# Patient Record
Sex: FEMALE | Race: ASIAN | NOT HISPANIC OR LATINO | URBAN - METROPOLITAN AREA
[De-identification: names, ages, dates, MRNs, and addresses within clinical notes are randomized per-mention and may not be internally consistent; named-entity substitution may affect disease eponyms.]

---

## 2017-01-18 PROBLEM — Z00.00 ENCOUNTER FOR PREVENTIVE HEALTH EXAMINATION: Status: ACTIVE | Noted: 2017-01-18

## 2019-06-12 ENCOUNTER — OUTPATIENT (OUTPATIENT)
Dept: OUTPATIENT SERVICES | Facility: HOSPITAL | Age: 74
LOS: 1 days | End: 2019-06-12
Payer: MEDICAID

## 2019-06-12 ENCOUNTER — APPOINTMENT (OUTPATIENT)
Dept: CV DIAGNOSTICS | Facility: HOSPITAL | Age: 74
End: 2019-06-12

## 2019-06-12 DIAGNOSIS — I25.10 ATHEROSCLEROTIC HEART DISEASE OF NATIVE CORONARY ARTERY WITHOUT ANGINA PECTORIS: ICD-10-CM

## 2019-06-12 PROCEDURE — 93018 CV STRESS TEST I&R ONLY: CPT

## 2019-06-12 PROCEDURE — 93017 CV STRESS TEST TRACING ONLY: CPT

## 2019-06-12 PROCEDURE — A9500: CPT

## 2019-06-12 PROCEDURE — 78452 HT MUSCLE IMAGE SPECT MULT: CPT | Mod: 26

## 2019-06-12 PROCEDURE — 93016 CV STRESS TEST SUPVJ ONLY: CPT

## 2019-06-12 PROCEDURE — 78452 HT MUSCLE IMAGE SPECT MULT: CPT

## 2019-07-11 ENCOUNTER — TRANSCRIPTION ENCOUNTER (OUTPATIENT)
Age: 74
End: 2019-07-11

## 2023-12-25 ENCOUNTER — INPATIENT (INPATIENT)
Facility: HOSPITAL | Age: 78
LOS: 9 days | Discharge: HOME HEALTH | End: 2024-01-04
Attending: INTERNAL MEDICINE | Admitting: INTERNAL MEDICINE
Payer: MEDICARE

## 2023-12-25 VITALS
SYSTOLIC BLOOD PRESSURE: 116 MMHG | DIASTOLIC BLOOD PRESSURE: 64 MMHG | TEMPERATURE: 98 F | WEIGHT: 119.93 LBS | RESPIRATION RATE: 20 BRPM | HEART RATE: 62 BPM | HEIGHT: 59 IN | OXYGEN SATURATION: 100 %

## 2023-12-25 LAB
ALBUMIN SERPL ELPH-MCNC: 3.6 G/DL — SIGNIFICANT CHANGE UP (ref 3.3–5)
ALBUMIN SERPL ELPH-MCNC: 3.6 G/DL — SIGNIFICANT CHANGE UP (ref 3.3–5)
ALP SERPL-CCNC: 132 U/L — HIGH (ref 40–120)
ALP SERPL-CCNC: 132 U/L — HIGH (ref 40–120)
ALT FLD-CCNC: 97 U/L — HIGH (ref 12–78)
ALT FLD-CCNC: 97 U/L — HIGH (ref 12–78)
ANION GAP SERPL CALC-SCNC: 8 MMOL/L — SIGNIFICANT CHANGE UP (ref 5–17)
ANION GAP SERPL CALC-SCNC: 8 MMOL/L — SIGNIFICANT CHANGE UP (ref 5–17)
AST SERPL-CCNC: 241 U/L — HIGH (ref 15–37)
AST SERPL-CCNC: 241 U/L — HIGH (ref 15–37)
BASOPHILS # BLD AUTO: 0.02 K/UL — SIGNIFICANT CHANGE UP (ref 0–0.2)
BASOPHILS # BLD AUTO: 0.02 K/UL — SIGNIFICANT CHANGE UP (ref 0–0.2)
BASOPHILS NFR BLD AUTO: 0.2 % — SIGNIFICANT CHANGE UP (ref 0–2)
BASOPHILS NFR BLD AUTO: 0.2 % — SIGNIFICANT CHANGE UP (ref 0–2)
BILIRUB SERPL-MCNC: 0.4 MG/DL — SIGNIFICANT CHANGE UP (ref 0.2–1.2)
BILIRUB SERPL-MCNC: 0.4 MG/DL — SIGNIFICANT CHANGE UP (ref 0.2–1.2)
BUN SERPL-MCNC: 13 MG/DL — SIGNIFICANT CHANGE UP (ref 7–23)
BUN SERPL-MCNC: 13 MG/DL — SIGNIFICANT CHANGE UP (ref 7–23)
CALCIUM SERPL-MCNC: 9 MG/DL — SIGNIFICANT CHANGE UP (ref 8.5–10.1)
CALCIUM SERPL-MCNC: 9 MG/DL — SIGNIFICANT CHANGE UP (ref 8.5–10.1)
CHLORIDE SERPL-SCNC: 106 MMOL/L — SIGNIFICANT CHANGE UP (ref 96–108)
CHLORIDE SERPL-SCNC: 106 MMOL/L — SIGNIFICANT CHANGE UP (ref 96–108)
CO2 SERPL-SCNC: 26 MMOL/L — SIGNIFICANT CHANGE UP (ref 22–31)
CO2 SERPL-SCNC: 26 MMOL/L — SIGNIFICANT CHANGE UP (ref 22–31)
CREAT SERPL-MCNC: 0.81 MG/DL — SIGNIFICANT CHANGE UP (ref 0.5–1.3)
CREAT SERPL-MCNC: 0.81 MG/DL — SIGNIFICANT CHANGE UP (ref 0.5–1.3)
EGFR: 75 ML/MIN/1.73M2 — SIGNIFICANT CHANGE UP
EGFR: 75 ML/MIN/1.73M2 — SIGNIFICANT CHANGE UP
EOSINOPHIL # BLD AUTO: 0.03 K/UL — SIGNIFICANT CHANGE UP (ref 0–0.5)
EOSINOPHIL # BLD AUTO: 0.03 K/UL — SIGNIFICANT CHANGE UP (ref 0–0.5)
EOSINOPHIL NFR BLD AUTO: 0.3 % — SIGNIFICANT CHANGE UP (ref 0–6)
EOSINOPHIL NFR BLD AUTO: 0.3 % — SIGNIFICANT CHANGE UP (ref 0–6)
GLUCOSE SERPL-MCNC: 115 MG/DL — HIGH (ref 70–99)
GLUCOSE SERPL-MCNC: 115 MG/DL — HIGH (ref 70–99)
HCT VFR BLD CALC: 35.1 % — SIGNIFICANT CHANGE UP (ref 34.5–45)
HCT VFR BLD CALC: 35.1 % — SIGNIFICANT CHANGE UP (ref 34.5–45)
HGB BLD-MCNC: 11.2 G/DL — LOW (ref 11.5–15.5)
HGB BLD-MCNC: 11.2 G/DL — LOW (ref 11.5–15.5)
IMM GRANULOCYTES NFR BLD AUTO: 0.2 % — SIGNIFICANT CHANGE UP (ref 0–0.9)
IMM GRANULOCYTES NFR BLD AUTO: 0.2 % — SIGNIFICANT CHANGE UP (ref 0–0.9)
LACTATE SERPL-SCNC: 2.9 MMOL/L — HIGH (ref 0.7–2)
LACTATE SERPL-SCNC: 2.9 MMOL/L — HIGH (ref 0.7–2)
LIDOCAIN IGE QN: >5000 U/L — HIGH (ref 13–75)
LIDOCAIN IGE QN: >5000 U/L — HIGH (ref 13–75)
LYMPHOCYTES # BLD AUTO: 1.37 K/UL — SIGNIFICANT CHANGE UP (ref 1–3.3)
LYMPHOCYTES # BLD AUTO: 1.37 K/UL — SIGNIFICANT CHANGE UP (ref 1–3.3)
LYMPHOCYTES # BLD AUTO: 12.9 % — LOW (ref 13–44)
LYMPHOCYTES # BLD AUTO: 12.9 % — LOW (ref 13–44)
MCHC RBC-ENTMCNC: 29.3 PG — SIGNIFICANT CHANGE UP (ref 27–34)
MCHC RBC-ENTMCNC: 29.3 PG — SIGNIFICANT CHANGE UP (ref 27–34)
MCHC RBC-ENTMCNC: 31.9 G/DL — LOW (ref 32–36)
MCHC RBC-ENTMCNC: 31.9 G/DL — LOW (ref 32–36)
MCV RBC AUTO: 91.9 FL — SIGNIFICANT CHANGE UP (ref 80–100)
MCV RBC AUTO: 91.9 FL — SIGNIFICANT CHANGE UP (ref 80–100)
MONOCYTES # BLD AUTO: 0.35 K/UL — SIGNIFICANT CHANGE UP (ref 0–0.9)
MONOCYTES # BLD AUTO: 0.35 K/UL — SIGNIFICANT CHANGE UP (ref 0–0.9)
MONOCYTES NFR BLD AUTO: 3.3 % — SIGNIFICANT CHANGE UP (ref 2–14)
MONOCYTES NFR BLD AUTO: 3.3 % — SIGNIFICANT CHANGE UP (ref 2–14)
NEUTROPHILS # BLD AUTO: 8.86 K/UL — HIGH (ref 1.8–7.4)
NEUTROPHILS # BLD AUTO: 8.86 K/UL — HIGH (ref 1.8–7.4)
NEUTROPHILS NFR BLD AUTO: 83.1 % — HIGH (ref 43–77)
NEUTROPHILS NFR BLD AUTO: 83.1 % — HIGH (ref 43–77)
NRBC # BLD: 0 /100 WBCS — SIGNIFICANT CHANGE UP (ref 0–0)
NRBC # BLD: 0 /100 WBCS — SIGNIFICANT CHANGE UP (ref 0–0)
PLATELET # BLD AUTO: 216 K/UL — SIGNIFICANT CHANGE UP (ref 150–400)
PLATELET # BLD AUTO: 216 K/UL — SIGNIFICANT CHANGE UP (ref 150–400)
POTASSIUM SERPL-MCNC: 4 MMOL/L — SIGNIFICANT CHANGE UP (ref 3.5–5.3)
POTASSIUM SERPL-MCNC: 4 MMOL/L — SIGNIFICANT CHANGE UP (ref 3.5–5.3)
POTASSIUM SERPL-SCNC: 4 MMOL/L — SIGNIFICANT CHANGE UP (ref 3.5–5.3)
POTASSIUM SERPL-SCNC: 4 MMOL/L — SIGNIFICANT CHANGE UP (ref 3.5–5.3)
PROT SERPL-MCNC: 7.3 GM/DL — SIGNIFICANT CHANGE UP (ref 6–8.3)
PROT SERPL-MCNC: 7.3 GM/DL — SIGNIFICANT CHANGE UP (ref 6–8.3)
RBC # BLD: 3.82 M/UL — SIGNIFICANT CHANGE UP (ref 3.8–5.2)
RBC # BLD: 3.82 M/UL — SIGNIFICANT CHANGE UP (ref 3.8–5.2)
RBC # FLD: 12.9 % — SIGNIFICANT CHANGE UP (ref 10.3–14.5)
RBC # FLD: 12.9 % — SIGNIFICANT CHANGE UP (ref 10.3–14.5)
SODIUM SERPL-SCNC: 140 MMOL/L — SIGNIFICANT CHANGE UP (ref 135–145)
SODIUM SERPL-SCNC: 140 MMOL/L — SIGNIFICANT CHANGE UP (ref 135–145)
TROPONIN I, HIGH SENSITIVITY RESULT: 5.2 NG/L — SIGNIFICANT CHANGE UP
TROPONIN I, HIGH SENSITIVITY RESULT: 5.2 NG/L — SIGNIFICANT CHANGE UP
WBC # BLD: 10.65 K/UL — HIGH (ref 3.8–10.5)
WBC # BLD: 10.65 K/UL — HIGH (ref 3.8–10.5)
WBC # FLD AUTO: 10.65 K/UL — HIGH (ref 3.8–10.5)
WBC # FLD AUTO: 10.65 K/UL — HIGH (ref 3.8–10.5)

## 2023-12-25 PROCEDURE — 93010 ELECTROCARDIOGRAM REPORT: CPT

## 2023-12-25 PROCEDURE — 71045 X-RAY EXAM CHEST 1 VIEW: CPT | Mod: 26

## 2023-12-25 PROCEDURE — 99285 EMERGENCY DEPT VISIT HI MDM: CPT

## 2023-12-25 PROCEDURE — 74174 CTA ABD&PLVS W/CONTRAST: CPT | Mod: 26,MA

## 2023-12-25 RX ORDER — PANTOPRAZOLE SODIUM 20 MG/1
40 TABLET, DELAYED RELEASE ORAL ONCE
Refills: 0 | Status: COMPLETED | OUTPATIENT
Start: 2023-12-25 | End: 2023-12-25

## 2023-12-25 RX ORDER — HYDROMORPHONE HYDROCHLORIDE 2 MG/ML
0.5 INJECTION INTRAMUSCULAR; INTRAVENOUS; SUBCUTANEOUS ONCE
Refills: 0 | Status: DISCONTINUED | OUTPATIENT
Start: 2023-12-25 | End: 2023-12-25

## 2023-12-25 RX ORDER — MORPHINE SULFATE 50 MG/1
4 CAPSULE, EXTENDED RELEASE ORAL ONCE
Refills: 0 | Status: DISCONTINUED | OUTPATIENT
Start: 2023-12-25 | End: 2023-12-25

## 2023-12-25 RX ORDER — ONDANSETRON 8 MG/1
4 TABLET, FILM COATED ORAL ONCE
Refills: 0 | Status: COMPLETED | OUTPATIENT
Start: 2023-12-25 | End: 2023-12-25

## 2023-12-25 RX ADMIN — ONDANSETRON 4 MILLIGRAM(S): 8 TABLET, FILM COATED ORAL at 21:07

## 2023-12-25 RX ADMIN — PANTOPRAZOLE SODIUM 40 MILLIGRAM(S): 20 TABLET, DELAYED RELEASE ORAL at 23:01

## 2023-12-25 RX ADMIN — MORPHINE SULFATE 4 MILLIGRAM(S): 50 CAPSULE, EXTENDED RELEASE ORAL at 21:37

## 2023-12-25 RX ADMIN — HYDROMORPHONE HYDROCHLORIDE 0.5 MILLIGRAM(S): 2 INJECTION INTRAMUSCULAR; INTRAVENOUS; SUBCUTANEOUS at 23:01

## 2023-12-25 RX ADMIN — HYDROMORPHONE HYDROCHLORIDE 0.5 MILLIGRAM(S): 2 INJECTION INTRAMUSCULAR; INTRAVENOUS; SUBCUTANEOUS at 23:31

## 2023-12-25 RX ADMIN — MORPHINE SULFATE 4 MILLIGRAM(S): 50 CAPSULE, EXTENDED RELEASE ORAL at 21:07

## 2023-12-25 NOTE — ED ADULT NURSE NOTE - CHIEF COMPLAINT QUOTE
PMH of HTN, DM  C/o generalized abdominal pain with nausea and vomiting for last 3 hrs. Also endorsed some dizziness, Reports eating [place] delicacy and started having symptoms afterwards.  Noted in distress.

## 2023-12-25 NOTE — ED ADULT NURSE NOTE - NSFALLUNIVINTERV_ED_ALL_ED
Bed/Stretcher in lowest position, wheels locked, appropriate side rails in place/Call bell, personal items and telephone in reach/Instruct patient to call for assistance before getting out of bed/chair/stretcher/Non-slip footwear applied when patient is off stretcher/[place] to call system/Physically safe environment - no spills, clutter or unnecessary equipment/Purposeful proactive rounding/Room/bathroom lighting operational, light cord in reach Bed/Stretcher in lowest position, wheels locked, appropriate side rails in place/Call bell, personal items and telephone in reach/Instruct patient to call for assistance before getting out of bed/chair/stretcher/Non-slip footwear applied when patient is off stretcher/[place] to call system/Physically safe environment - no spills, clutter or unnecessary equipment/Purposeful proactive rounding/Room/bathroom lighting operational, light cord in reach

## 2023-12-25 NOTE — ED ADULT NURSE NOTE - OBJECTIVE STATEMENT
Patient is AAOx4. 78 year old female presents to ED with complaint of generalized abdominal pain associated with nausea, vomiting for approximately the last 3 hours after she ate a purple rice cake. Denies chest pain, shortness of breath, dyspnea, diarrhea, constipation, dizziness, weakness, headache, fever, chills, cough, flank pain, hematuria, dysuria, polyuria, urinary frequency, urinary urgency, hematochezia, dyschezia. Respirations equal and unlabored, no acute distress noted at this time.

## 2023-12-25 NOTE — ED ADULT TRIAGE NOTE - CHIEF COMPLAINT QUOTE
PMH of HTN, DM  C/o generalized abdominal pain with nausea and vomiting for last 3 hrs. Also endorsed some dizziness, Reports ate [place] delicacy and started having symptoms afterwards.  Noted in distress. PMH of HTN, DM  C/o generalized abdominal pain with nausea and vomiting for last 3 hrs. Also endorsed some dizziness, Reports eating [place] delicacy and started having symptoms afterwards.  Noted in distress.

## 2023-12-26 LAB
ALBUMIN SERPL ELPH-MCNC: 3.3 G/DL — SIGNIFICANT CHANGE UP (ref 3.3–5)
ALBUMIN SERPL ELPH-MCNC: 3.3 G/DL — SIGNIFICANT CHANGE UP (ref 3.3–5)
ALP SERPL-CCNC: 111 U/L — SIGNIFICANT CHANGE UP (ref 40–120)
ALP SERPL-CCNC: 111 U/L — SIGNIFICANT CHANGE UP (ref 40–120)
ALT FLD-CCNC: 94 U/L — HIGH (ref 12–78)
ALT FLD-CCNC: 94 U/L — HIGH (ref 12–78)
ANION GAP SERPL CALC-SCNC: 7 MMOL/L — SIGNIFICANT CHANGE UP (ref 5–17)
ANION GAP SERPL CALC-SCNC: 7 MMOL/L — SIGNIFICANT CHANGE UP (ref 5–17)
AST SERPL-CCNC: 132 U/L — HIGH (ref 15–37)
AST SERPL-CCNC: 132 U/L — HIGH (ref 15–37)
BILIRUB SERPL-MCNC: 0.7 MG/DL — SIGNIFICANT CHANGE UP (ref 0.2–1.2)
BILIRUB SERPL-MCNC: 0.7 MG/DL — SIGNIFICANT CHANGE UP (ref 0.2–1.2)
BUN SERPL-MCNC: 15 MG/DL — SIGNIFICANT CHANGE UP (ref 7–23)
BUN SERPL-MCNC: 15 MG/DL — SIGNIFICANT CHANGE UP (ref 7–23)
CALCIUM SERPL-MCNC: 8.9 MG/DL — SIGNIFICANT CHANGE UP (ref 8.5–10.1)
CALCIUM SERPL-MCNC: 8.9 MG/DL — SIGNIFICANT CHANGE UP (ref 8.5–10.1)
CHLORIDE SERPL-SCNC: 105 MMOL/L — SIGNIFICANT CHANGE UP (ref 96–108)
CHLORIDE SERPL-SCNC: 105 MMOL/L — SIGNIFICANT CHANGE UP (ref 96–108)
CO2 SERPL-SCNC: 28 MMOL/L — SIGNIFICANT CHANGE UP (ref 22–31)
CO2 SERPL-SCNC: 28 MMOL/L — SIGNIFICANT CHANGE UP (ref 22–31)
CREAT SERPL-MCNC: 0.86 MG/DL — SIGNIFICANT CHANGE UP (ref 0.5–1.3)
CREAT SERPL-MCNC: 0.86 MG/DL — SIGNIFICANT CHANGE UP (ref 0.5–1.3)
EGFR: 70 ML/MIN/1.73M2 — SIGNIFICANT CHANGE UP
EGFR: 70 ML/MIN/1.73M2 — SIGNIFICANT CHANGE UP
GLUCOSE SERPL-MCNC: 151 MG/DL — HIGH (ref 70–99)
GLUCOSE SERPL-MCNC: 151 MG/DL — HIGH (ref 70–99)
HCT VFR BLD CALC: 33.3 % — LOW (ref 34.5–45)
HCT VFR BLD CALC: 33.3 % — LOW (ref 34.5–45)
HGB BLD-MCNC: 10.9 G/DL — LOW (ref 11.5–15.5)
HGB BLD-MCNC: 10.9 G/DL — LOW (ref 11.5–15.5)
LDH SERPL L TO P-CCNC: 458 U/L — HIGH (ref 50–242)
LDH SERPL L TO P-CCNC: 458 U/L — HIGH (ref 50–242)
MCHC RBC-ENTMCNC: 29.9 PG — SIGNIFICANT CHANGE UP (ref 27–34)
MCHC RBC-ENTMCNC: 29.9 PG — SIGNIFICANT CHANGE UP (ref 27–34)
MCHC RBC-ENTMCNC: 32.7 G/DL — SIGNIFICANT CHANGE UP (ref 32–36)
MCHC RBC-ENTMCNC: 32.7 G/DL — SIGNIFICANT CHANGE UP (ref 32–36)
MCV RBC AUTO: 91.5 FL — SIGNIFICANT CHANGE UP (ref 80–100)
MCV RBC AUTO: 91.5 FL — SIGNIFICANT CHANGE UP (ref 80–100)
NRBC # BLD: 0 /100 WBCS — SIGNIFICANT CHANGE UP (ref 0–0)
NRBC # BLD: 0 /100 WBCS — SIGNIFICANT CHANGE UP (ref 0–0)
PLATELET # BLD AUTO: 217 K/UL — SIGNIFICANT CHANGE UP (ref 150–400)
PLATELET # BLD AUTO: 217 K/UL — SIGNIFICANT CHANGE UP (ref 150–400)
POTASSIUM SERPL-MCNC: 4 MMOL/L — SIGNIFICANT CHANGE UP (ref 3.5–5.3)
POTASSIUM SERPL-MCNC: 4 MMOL/L — SIGNIFICANT CHANGE UP (ref 3.5–5.3)
POTASSIUM SERPL-SCNC: 4 MMOL/L — SIGNIFICANT CHANGE UP (ref 3.5–5.3)
POTASSIUM SERPL-SCNC: 4 MMOL/L — SIGNIFICANT CHANGE UP (ref 3.5–5.3)
PROT SERPL-MCNC: 6.9 GM/DL — SIGNIFICANT CHANGE UP (ref 6–8.3)
PROT SERPL-MCNC: 6.9 GM/DL — SIGNIFICANT CHANGE UP (ref 6–8.3)
RBC # BLD: 3.64 M/UL — LOW (ref 3.8–5.2)
RBC # BLD: 3.64 M/UL — LOW (ref 3.8–5.2)
RBC # FLD: 13 % — SIGNIFICANT CHANGE UP (ref 10.3–14.5)
RBC # FLD: 13 % — SIGNIFICANT CHANGE UP (ref 10.3–14.5)
SODIUM SERPL-SCNC: 140 MMOL/L — SIGNIFICANT CHANGE UP (ref 135–145)
SODIUM SERPL-SCNC: 140 MMOL/L — SIGNIFICANT CHANGE UP (ref 135–145)
TRIGL SERPL-MCNC: 177 MG/DL — HIGH
TRIGL SERPL-MCNC: 177 MG/DL — HIGH
WBC # BLD: 11.55 K/UL — HIGH (ref 3.8–10.5)
WBC # BLD: 11.55 K/UL — HIGH (ref 3.8–10.5)
WBC # FLD AUTO: 11.55 K/UL — HIGH (ref 3.8–10.5)
WBC # FLD AUTO: 11.55 K/UL — HIGH (ref 3.8–10.5)

## 2023-12-26 PROCEDURE — 76705 ECHO EXAM OF ABDOMEN: CPT | Mod: 26

## 2023-12-26 PROCEDURE — 74183 MRI ABD W/O CNTR FLWD CNTR: CPT | Mod: 26

## 2023-12-26 PROCEDURE — 99222 1ST HOSP IP/OBS MODERATE 55: CPT

## 2023-12-26 PROCEDURE — 99223 1ST HOSP IP/OBS HIGH 75: CPT

## 2023-12-26 RX ORDER — LANOLIN ALCOHOL/MO/W.PET/CERES
3 CREAM (GRAM) TOPICAL AT BEDTIME
Refills: 0 | Status: DISCONTINUED | OUTPATIENT
Start: 2023-12-26 | End: 2024-01-04

## 2023-12-26 RX ORDER — HEPARIN SODIUM 5000 [USP'U]/ML
5000 INJECTION INTRAVENOUS; SUBCUTANEOUS EVERY 8 HOURS
Refills: 0 | Status: DISCONTINUED | OUTPATIENT
Start: 2023-12-26 | End: 2024-01-04

## 2023-12-26 RX ORDER — SODIUM CHLORIDE 9 MG/ML
1000 INJECTION, SOLUTION INTRAVENOUS
Refills: 0 | Status: DISCONTINUED | OUTPATIENT
Start: 2023-12-26 | End: 2023-12-30

## 2023-12-26 RX ORDER — INFLUENZA VIRUS VACCINE 15; 15; 15; 15 UG/.5ML; UG/.5ML; UG/.5ML; UG/.5ML
0.7 SUSPENSION INTRAMUSCULAR ONCE
Refills: 0 | Status: DISCONTINUED | OUTPATIENT
Start: 2023-12-26 | End: 2024-01-04

## 2023-12-26 RX ORDER — HYDROMORPHONE HYDROCHLORIDE 2 MG/ML
1 INJECTION INTRAMUSCULAR; INTRAVENOUS; SUBCUTANEOUS ONCE
Refills: 0 | Status: DISCONTINUED | OUTPATIENT
Start: 2023-12-26 | End: 2023-12-26

## 2023-12-26 RX ORDER — ONDANSETRON 8 MG/1
4 TABLET, FILM COATED ORAL EVERY 8 HOURS
Refills: 0 | Status: DISCONTINUED | OUTPATIENT
Start: 2023-12-26 | End: 2024-01-04

## 2023-12-26 RX ORDER — ACETAMINOPHEN 500 MG
650 TABLET ORAL EVERY 6 HOURS
Refills: 0 | Status: DISCONTINUED | OUTPATIENT
Start: 2023-12-26 | End: 2024-01-04

## 2023-12-26 RX ADMIN — Medication 650 MILLIGRAM(S): at 07:04

## 2023-12-26 RX ADMIN — SODIUM CHLORIDE 100 MILLILITER(S): 9 INJECTION, SOLUTION INTRAVENOUS at 03:30

## 2023-12-26 RX ADMIN — SODIUM CHLORIDE 100 MILLILITER(S): 9 INJECTION, SOLUTION INTRAVENOUS at 15:02

## 2023-12-26 RX ADMIN — ONDANSETRON 4 MILLIGRAM(S): 8 TABLET, FILM COATED ORAL at 07:03

## 2023-12-26 RX ADMIN — HEPARIN SODIUM 5000 UNIT(S): 5000 INJECTION INTRAVENOUS; SUBCUTANEOUS at 21:43

## 2023-12-26 RX ADMIN — HEPARIN SODIUM 5000 UNIT(S): 5000 INJECTION INTRAVENOUS; SUBCUTANEOUS at 14:56

## 2023-12-26 RX ADMIN — HYDROMORPHONE HYDROCHLORIDE 1 MILLIGRAM(S): 2 INJECTION INTRAMUSCULAR; INTRAVENOUS; SUBCUTANEOUS at 03:26

## 2023-12-26 NOTE — CONSULT NOTE ADULT - NS ATTEND AMEND GEN_ALL_CORE FT
gallstone pancreatitis, tender epigastric on exam. Continue pancreatitis management. Possible OR planning prior to discharge. MRCP pending

## 2023-12-26 NOTE — H&P ADULT - ASSESSMENT
78F w/ hx of HTN, DM p/w abdominal pain. Found to have pancreatitis w/ cholelithiasis and gallbladder wall thickening c/f choledocolithiasis.    #Pancreatitis  - NPO  - IV LR  - MRCP    #FEN/PPX  - NPO  - SQH for DVT ppx

## 2023-12-26 NOTE — CONSULT NOTE ADULT - SUBJECTIVE AND OBJECTIVE BOX
Chief Complaint:  Patient is a 78y old  Female who presents with a chief complaint of abdominal pain    HPI: 78F denies pmh presents for abdominal pain for several days, denies fevers chills, nausa or vomiting.        PMH/PSH:PAST MEDICAL & SURGICAL HISTORY:      Allergies:  No Known Allergies      Medications:  acetaminophen     Tablet .. 650 milliGRAM(s) Oral every 6 hours PRN  aluminum hydroxide/magnesium hydroxide/simethicone Suspension 30 milliLiter(s) Oral every 4 hours PRN  lactated ringers. 1000 milliLiter(s) IV Continuous <Continuous>  melatonin 3 milliGRAM(s) Oral at bedtime PRN  ondansetron Injectable 4 milliGRAM(s) IV Push every 8 hours PRN      REVIEW OF SYSTEMS:  All other review of systems is negative unless indicated above.    Relevant Family History:   FAMILY HISTORY:      Relevant Social History:  Denies ETOH or tobacco history    Physical Exam:    Vital Signs:  Vital Signs Last 24 Hrs  T(C): 36.9 (25 Dec 2023 20:14), Max: 36.9 (25 Dec 2023 20:14)  T(F): 98.4 (25 Dec 2023 20:14), Max: 98.4 (25 Dec 2023 20:14)  HR: 62 (25 Dec 2023 20:14) (62 - 62)  BP: 116/64 (25 Dec 2023 20:14) (116/64 - 116/64)  BP(mean): --  RR: 20 (25 Dec 2023 20:14) (20 - 20)  SpO2: 100% (25 Dec 2023 20:14) (100% - 100%)    Parameters below as of 25 Dec 2023 20:14  Patient On (Oxygen Delivery Method): room air      Daily Height in cm: 149.86 (25 Dec 2023 20:14)    Daily     Constitutional: non toxic  HEENT: anicteric sclera,  Respiratory: normal work of breathing  Cardiac: RRR;   Gastrointestinal: soft, +epigastric tenderness, not peritoneal   Extremities: , no clubbing or cyanosis; no peripheral edema   Chief Complaint:  Patient is a 78y old  Female who presents with a chief complaint of abdominal pain    HPI: 78F pmg DM, HTN presents for abdominal pain for several days, worsening over the last 24h, also reports nausea and NBNB vomiting.  Denies fevers chills.  No previous abdominal surgeries.  Denies alcohol use.      PMH/PSH:PAST MEDICAL & SURGICAL HISTORY:      Allergies:  No Known Allergies      Medications:  acetaminophen     Tablet .. 650 milliGRAM(s) Oral every 6 hours PRN  aluminum hydroxide/magnesium hydroxide/simethicone Suspension 30 milliLiter(s) Oral every 4 hours PRN  lactated ringers. 1000 milliLiter(s) IV Continuous <Continuous>  melatonin 3 milliGRAM(s) Oral at bedtime PRN  ondansetron Injectable 4 milliGRAM(s) IV Push every 8 hours PRN      REVIEW OF SYSTEMS:  All other review of systems is negative unless indicated above.    Relevant Family History:   FAMILY HISTORY:      Relevant Social History:  Denies ETOH or tobacco history    Physical Exam:    Vital Signs:  Vital Signs Last 24 Hrs  T(C): 36.9 (25 Dec 2023 20:14), Max: 36.9 (25 Dec 2023 20:14)  T(F): 98.4 (25 Dec 2023 20:14), Max: 98.4 (25 Dec 2023 20:14)  HR: 62 (25 Dec 2023 20:14) (62 - 62)  BP: 116/64 (25 Dec 2023 20:14) (116/64 - 116/64)  BP(mean): --  RR: 20 (25 Dec 2023 20:14) (20 - 20)  SpO2: 100% (25 Dec 2023 20:14) (100% - 100%)    Parameters below as of 25 Dec 2023 20:14  Patient On (Oxygen Delivery Method): room air      Daily Height in cm: 149.86 (25 Dec 2023 20:14)    Daily     Constitutional: non toxic  HEENT: anicteric sclera,  Respiratory: normal work of breathing  Cardiac: RRR;   Gastrointestinal: soft, +epigastric tenderness, not peritoneal   Extremities: , no clubbing or cyanosis; no peripheral edema

## 2023-12-26 NOTE — ED PROVIDER NOTE - CLINICAL SUMMARY MEDICAL DECISION MAKING FREE TEXT BOX
HPI and PMH as above  Nausea, vomiting  Severe pain  Concern for pancreatitis, mesenteric ischemia, SBO, ACS  Patient labs show lipase over 5000  Patient pain controlled, IVF started  Vitals within normal limits, no fever      CT shows ": Peripancreatic fluid and stranding. No definite area of   pancreatic necrosis or organized collection. Fluid extends to the   anterior and lateral upper abdominal planes including the manju hepatis   and pericholecystic region"    Some gallstones and CBD dilation noted on CT  No signs of obstruction in labs no direct bilirubinemia  Negative troponin  EKG is non ischemic  NSR 66   Normal axis  No ST elevations or depressions    US of gallbladder ordered  Surgery consulted  Patient admitted for further management HPI and PMH as above  Nausea, vomiting  Severe pain  Concern for pancreatitis, mesenteric ischemia, SBO, ACS  Lab work and imaging ordered      Patient labs show lipase over 5000 suggesting pancreatitis   Patient pain controlled, IVF started  Vitals within normal limits, no fever      CT shows ": Peripancreatic fluid and stranding. No definite area of   pancreatic necrosis or organized collection. Fluid extends to the   anterior and lateral upper abdominal planes including the manju hepatis   and pericholecystic region"    Some gallstones and CBD dilation noted on CT  No signs of obstruction in labs no direct bilirubinemia, however surgery consulted and patient will possibly need ERCP and eventual cholecystectomy    Patient made NPO and further IVF and pain control given         Negative troponin  EKG is non ischemic  NSR 66   Normal axis  No ST elevations or depressions    US of gallbladder ordered  Patient admitted for further management of pancreatitis.

## 2023-12-26 NOTE — PATIENT PROFILE ADULT - FUNCTIONAL ASSESSMENT - BASIC MOBILITY 1.
D/C: Order noted for d/c. Pt confirmed d/c paperwork and prescriptions have correct name. Discharge and education instructions reviewed with patient. Teach-back successful. Pt verbalized understanding and signed d/c papers. Pt denied questions at this time. No acute distress noted. Patient instructed to follow-up as noted - return to emergency department if symptoms worsen. Patient verbalized understanding. Discharged per EDMD with discharge instructions. Pt discharged and ambulated to private vehicle. Patient stable upon departure. Thanked patient for choosing [facility] for care.             Maria Teresa Kaur RN  05/28/20 [id number] = A little assistance

## 2023-12-26 NOTE — CONSULT NOTE ADULT - ASSESSMENT
78F with abdominal pain, leukocytosis, elevated lipase  CT a/p shows perpancreatic fluid and stranding.  suggestive of acute pancreatitis.  Also dilated CBD. cholelithiasis  concern for gallstone pancreatitis  US abd and MRCP pending  No emergent need for surgical intervention until resolution of pancreatitis  recommend admission to medicine for supportive care and monitoring  IVF, pain control, enteral diet

## 2023-12-26 NOTE — H&P ADULT - HISTORY OF PRESENT ILLNESS
78F w/ hx of HTN, DM p/w abdominal pain. Sx began several days prior but progressively worsening. Worse after eating food. Associated w/ NBNB emesis. Denies fever/chills/sweats, diarrhea, EtOH use.    In ED, pt w/ low grade fever to 37.5 and tachycardic to 100s. Labs notable for WBC 10.7, Alk phos 241, AST 97, lipase > 5000, lactate 2.9. CT AP w/ e/o pancreatitis. RUQ US w/ cholelithiasis and gallbladder wall thickening.

## 2023-12-26 NOTE — H&P ADULT - NSHPLABSRESULTS_GEN_ALL_CORE
10.9   11.55 )-----------( 217      ( 26 Dec 2023 06:05 )             33.3     12-25    140  |  106  |  13  ----------------------------<  115<H>  4.0   |  26  |  0.81    Ca    9.0      25 Dec 2023 21:04    TPro  7.3  /  Alb  3.6  /  TBili  0.4  /  DBili  x   /  AST  241<H>  /  ALT  97<H>  /  AlkPhos  132<H>  12-25      Urinalysis Basic - ( 25 Dec 2023 21:04 )    Color: x / Appearance: x / SG: x / pH: x  Gluc: 115 mg/dL / Ketone: x  / Bili: x / Urobili: x   Blood: x / Protein: x / Nitrite: x   Leuk Esterase: x / RBC: x / WBC x   Sq Epi: x / Non Sq Epi: x / Bacteria: x

## 2023-12-26 NOTE — PATIENT PROFILE ADULT - FALL HARM RISK - HARM RISK INTERVENTIONS
Assistance with ambulation/Assistance OOB with selected safe patient handling equipment/Communicate Risk of Fall with Harm to all staff/Reinforce activity limits and safety measures with patient and family/Tailored Fall Risk Interventions/Visual Cue: Yellow wristband and red socks/Bed in lowest position, wheels locked, appropriate side rails in place/Call bell, personal items and telephone in reach/Instruct patient to call for assistance before getting out of bed or chair/Non-slip footwear when patient is out of bed/[place] to call system/Physically safe environment - no spills, clutter or unnecessary equipment/Purposeful Proactive Rounding/Room/bathroom lighting operational, light cord in reach Assistance with ambulation/Assistance OOB with selected safe patient handling equipment/Communicate Risk of Fall with Harm to all staff/Reinforce activity limits and safety measures with patient and family/Tailored Fall Risk Interventions/Visual Cue: Yellow wristband and red socks/Bed in lowest position, wheels locked, appropriate side rails in place/Call bell, personal items and telephone in reach/Instruct patient to call for assistance before getting out of bed or chair/Non-slip footwear when patient is out of bed/[place] to call system/Physically safe environment - no spills, clutter or unnecessary equipment/Purposeful Proactive Rounding/Room/bathroom lighting operational, light cord in reach

## 2023-12-26 NOTE — ED PROVIDER NOTE - OBJECTIVE STATEMENT
78 year old female here with severe epigastric abdominal pain that began after eating. The patient endorses nausea and vomiting. The patient denies fever or chills. Denies SOB or chest pain. The patient denies any BRBPR or melena. Denies ETOH use.

## 2023-12-26 NOTE — H&P ADULT - NSHPPHYSICALEXAM_GEN_ALL_CORE
T(C): 36.8 (12-26-23 @ 05:37), Max: 37.5 (12-26-23 @ 02:45)  HR: 99 (12-26-23 @ 05:37) (62 - 106)  BP: 165/71 (12-26-23 @ 05:37) (116/64 - 168/67)  RR: 18 (12-26-23 @ 05:37) (16 - 20)  SpO2: 95% (12-26-23 @ 05:37) (95% - 100%)    CONSTITUTIONAL: Well groomed, no apparent distress  EYES: PERRLA and symmetric, EOMI, No conjunctival or scleral injection, non-icteric  ENMT: Oral mucosa with moist membranes. Normal dentition; no pharyngeal injection or exudates             NECK: Supple, symmetric and without tracheal deviation   RESP: No respiratory distress, no use of accessory muscles; CTA b/l, no WRR  CV: RRR, +S1S2, no MRG; no JVD; no peripheral edema  GI: Soft, NT, ND, no rebound, no guarding; no palpable masses; no hepatosplenomegaly; no hernia palpated  LYMPH: No cervical LAD or tenderness; no axillary LAD or tenderness; no inguinal LAD or tenderness  MSK: Normal gait; No digital clubbing or cyanosis; examination of the (head/neck/spine/ribs/pelvis, RUE, LUE, RLE, LLE) without misalignment,            Normal ROM without pain, no spinal tenderness, normal muscle strength/tone  SKIN: No rashes or ulcers noted; no subcutaneous nodules or induration palpable  NEURO: CN II-XII intact; normal reflexes in upper and lower extremities, sensation intact in upper and lower extremities b/l to light touch   PSYCH: Appropriate insight/judgment; A+O x 3, mood and affect appropriate, recent/remote memory intact

## 2023-12-27 LAB
ALBUMIN SERPL ELPH-MCNC: 2.7 G/DL — LOW (ref 3.3–5)
ALBUMIN SERPL ELPH-MCNC: 2.7 G/DL — LOW (ref 3.3–5)
ALP SERPL-CCNC: 104 U/L — SIGNIFICANT CHANGE UP (ref 40–120)
ALP SERPL-CCNC: 104 U/L — SIGNIFICANT CHANGE UP (ref 40–120)
ALT FLD-CCNC: 61 U/L — SIGNIFICANT CHANGE UP (ref 12–78)
ALT FLD-CCNC: 61 U/L — SIGNIFICANT CHANGE UP (ref 12–78)
ANION GAP SERPL CALC-SCNC: 6 MMOL/L — SIGNIFICANT CHANGE UP (ref 5–17)
ANION GAP SERPL CALC-SCNC: 6 MMOL/L — SIGNIFICANT CHANGE UP (ref 5–17)
APPEARANCE UR: CLEAR — SIGNIFICANT CHANGE UP
APPEARANCE UR: CLEAR — SIGNIFICANT CHANGE UP
AST SERPL-CCNC: 52 U/L — HIGH (ref 15–37)
AST SERPL-CCNC: 52 U/L — HIGH (ref 15–37)
BACTERIA # UR AUTO: ABNORMAL /HPF
BACTERIA # UR AUTO: ABNORMAL /HPF
BILIRUB SERPL-MCNC: 1 MG/DL — SIGNIFICANT CHANGE UP (ref 0.2–1.2)
BILIRUB SERPL-MCNC: 1 MG/DL — SIGNIFICANT CHANGE UP (ref 0.2–1.2)
BILIRUB UR-MCNC: NEGATIVE — SIGNIFICANT CHANGE UP
BILIRUB UR-MCNC: NEGATIVE — SIGNIFICANT CHANGE UP
BUN SERPL-MCNC: 12 MG/DL — SIGNIFICANT CHANGE UP (ref 7–23)
BUN SERPL-MCNC: 12 MG/DL — SIGNIFICANT CHANGE UP (ref 7–23)
CALCIUM SERPL-MCNC: 8.7 MG/DL — SIGNIFICANT CHANGE UP (ref 8.5–10.1)
CALCIUM SERPL-MCNC: 8.7 MG/DL — SIGNIFICANT CHANGE UP (ref 8.5–10.1)
CHLORIDE SERPL-SCNC: 107 MMOL/L — SIGNIFICANT CHANGE UP (ref 96–108)
CHLORIDE SERPL-SCNC: 107 MMOL/L — SIGNIFICANT CHANGE UP (ref 96–108)
CO2 SERPL-SCNC: 25 MMOL/L — SIGNIFICANT CHANGE UP (ref 22–31)
CO2 SERPL-SCNC: 25 MMOL/L — SIGNIFICANT CHANGE UP (ref 22–31)
COLOR SPEC: YELLOW — SIGNIFICANT CHANGE UP
COLOR SPEC: YELLOW — SIGNIFICANT CHANGE UP
CREAT SERPL-MCNC: 0.71 MG/DL — SIGNIFICANT CHANGE UP (ref 0.5–1.3)
CREAT SERPL-MCNC: 0.71 MG/DL — SIGNIFICANT CHANGE UP (ref 0.5–1.3)
DIFF PNL FLD: ABNORMAL
DIFF PNL FLD: ABNORMAL
EGFR: 88 ML/MIN/1.73M2 — SIGNIFICANT CHANGE UP
EGFR: 88 ML/MIN/1.73M2 — SIGNIFICANT CHANGE UP
EPI CELLS # UR: PRESENT
EPI CELLS # UR: PRESENT
GLUCOSE SERPL-MCNC: 86 MG/DL — SIGNIFICANT CHANGE UP (ref 70–99)
GLUCOSE SERPL-MCNC: 86 MG/DL — SIGNIFICANT CHANGE UP (ref 70–99)
GLUCOSE UR QL: NEGATIVE MG/DL — SIGNIFICANT CHANGE UP
GLUCOSE UR QL: NEGATIVE MG/DL — SIGNIFICANT CHANGE UP
HCT VFR BLD CALC: 34.5 % — SIGNIFICANT CHANGE UP (ref 34.5–45)
HCT VFR BLD CALC: 34.5 % — SIGNIFICANT CHANGE UP (ref 34.5–45)
HCV AB S/CO SERPL IA: 0.11 S/CO — SIGNIFICANT CHANGE UP (ref 0–0.99)
HCV AB S/CO SERPL IA: 0.11 S/CO — SIGNIFICANT CHANGE UP (ref 0–0.99)
HCV AB SERPL-IMP: SIGNIFICANT CHANGE UP
HCV AB SERPL-IMP: SIGNIFICANT CHANGE UP
HGB BLD-MCNC: 11.2 G/DL — LOW (ref 11.5–15.5)
HGB BLD-MCNC: 11.2 G/DL — LOW (ref 11.5–15.5)
KETONES UR-MCNC: NEGATIVE MG/DL — SIGNIFICANT CHANGE UP
KETONES UR-MCNC: NEGATIVE MG/DL — SIGNIFICANT CHANGE UP
LACTATE SERPL-SCNC: 1.7 MMOL/L — SIGNIFICANT CHANGE UP (ref 0.7–2)
LACTATE SERPL-SCNC: 1.7 MMOL/L — SIGNIFICANT CHANGE UP (ref 0.7–2)
LEUKOCYTE ESTERASE UR-ACNC: ABNORMAL
LEUKOCYTE ESTERASE UR-ACNC: ABNORMAL
MAGNESIUM SERPL-MCNC: 1.9 MG/DL — SIGNIFICANT CHANGE UP (ref 1.6–2.6)
MAGNESIUM SERPL-MCNC: 1.9 MG/DL — SIGNIFICANT CHANGE UP (ref 1.6–2.6)
MCHC RBC-ENTMCNC: 30.1 PG — SIGNIFICANT CHANGE UP (ref 27–34)
MCHC RBC-ENTMCNC: 30.1 PG — SIGNIFICANT CHANGE UP (ref 27–34)
MCHC RBC-ENTMCNC: 32.5 G/DL — SIGNIFICANT CHANGE UP (ref 32–36)
MCHC RBC-ENTMCNC: 32.5 G/DL — SIGNIFICANT CHANGE UP (ref 32–36)
MCV RBC AUTO: 92.7 FL — SIGNIFICANT CHANGE UP (ref 80–100)
MCV RBC AUTO: 92.7 FL — SIGNIFICANT CHANGE UP (ref 80–100)
NITRITE UR-MCNC: NEGATIVE — SIGNIFICANT CHANGE UP
NITRITE UR-MCNC: NEGATIVE — SIGNIFICANT CHANGE UP
NRBC # BLD: 0 /100 WBCS — SIGNIFICANT CHANGE UP (ref 0–0)
NRBC # BLD: 0 /100 WBCS — SIGNIFICANT CHANGE UP (ref 0–0)
PH UR: 6 — SIGNIFICANT CHANGE UP (ref 5–8)
PH UR: 6 — SIGNIFICANT CHANGE UP (ref 5–8)
PHOSPHATE SERPL-MCNC: 2.9 MG/DL — SIGNIFICANT CHANGE UP (ref 2.5–4.5)
PHOSPHATE SERPL-MCNC: 2.9 MG/DL — SIGNIFICANT CHANGE UP (ref 2.5–4.5)
PLATELET # BLD AUTO: 188 K/UL — SIGNIFICANT CHANGE UP (ref 150–400)
PLATELET # BLD AUTO: 188 K/UL — SIGNIFICANT CHANGE UP (ref 150–400)
POTASSIUM SERPL-MCNC: 3.8 MMOL/L — SIGNIFICANT CHANGE UP (ref 3.5–5.3)
POTASSIUM SERPL-MCNC: 3.8 MMOL/L — SIGNIFICANT CHANGE UP (ref 3.5–5.3)
POTASSIUM SERPL-SCNC: 3.8 MMOL/L — SIGNIFICANT CHANGE UP (ref 3.5–5.3)
POTASSIUM SERPL-SCNC: 3.8 MMOL/L — SIGNIFICANT CHANGE UP (ref 3.5–5.3)
PROT SERPL-MCNC: 6.6 GM/DL — SIGNIFICANT CHANGE UP (ref 6–8.3)
PROT SERPL-MCNC: 6.6 GM/DL — SIGNIFICANT CHANGE UP (ref 6–8.3)
PROT UR-MCNC: SIGNIFICANT CHANGE UP MG/DL
PROT UR-MCNC: SIGNIFICANT CHANGE UP MG/DL
RBC # BLD: 3.72 M/UL — LOW (ref 3.8–5.2)
RBC # BLD: 3.72 M/UL — LOW (ref 3.8–5.2)
RBC # FLD: 13.4 % — SIGNIFICANT CHANGE UP (ref 10.3–14.5)
RBC # FLD: 13.4 % — SIGNIFICANT CHANGE UP (ref 10.3–14.5)
RBC CASTS # UR COMP ASSIST: SIGNIFICANT CHANGE UP /HPF (ref 0–4)
RBC CASTS # UR COMP ASSIST: SIGNIFICANT CHANGE UP /HPF (ref 0–4)
SODIUM SERPL-SCNC: 138 MMOL/L — SIGNIFICANT CHANGE UP (ref 135–145)
SODIUM SERPL-SCNC: 138 MMOL/L — SIGNIFICANT CHANGE UP (ref 135–145)
SP GR SPEC: <1.005 — LOW (ref 1–1.03)
SP GR SPEC: <1.005 — LOW (ref 1–1.03)
UROBILINOGEN FLD QL: 0.2 MG/DL — SIGNIFICANT CHANGE UP (ref 0.2–1)
UROBILINOGEN FLD QL: 0.2 MG/DL — SIGNIFICANT CHANGE UP (ref 0.2–1)
WBC # BLD: 8.18 K/UL — SIGNIFICANT CHANGE UP (ref 3.8–10.5)
WBC # BLD: 8.18 K/UL — SIGNIFICANT CHANGE UP (ref 3.8–10.5)
WBC # FLD AUTO: 8.18 K/UL — SIGNIFICANT CHANGE UP (ref 3.8–10.5)
WBC # FLD AUTO: 8.18 K/UL — SIGNIFICANT CHANGE UP (ref 3.8–10.5)
WBC UR QL: 15 /HPF — HIGH (ref 0–5)
WBC UR QL: 15 /HPF — HIGH (ref 0–5)

## 2023-12-27 PROCEDURE — 99232 SBSQ HOSP IP/OBS MODERATE 35: CPT

## 2023-12-27 PROCEDURE — 99233 SBSQ HOSP IP/OBS HIGH 50: CPT

## 2023-12-27 RX ORDER — ACETAMINOPHEN 500 MG
1000 TABLET ORAL ONCE
Refills: 0 | Status: COMPLETED | OUTPATIENT
Start: 2023-12-27 | End: 2023-12-27

## 2023-12-27 RX ORDER — BENZOCAINE AND MENTHOL 5; 1 G/100ML; G/100ML
1 LIQUID ORAL ONCE
Refills: 0 | Status: COMPLETED | OUTPATIENT
Start: 2023-12-27 | End: 2023-12-27

## 2023-12-27 RX ORDER — CEFTRIAXONE 500 MG/1
1000 INJECTION, POWDER, FOR SOLUTION INTRAMUSCULAR; INTRAVENOUS EVERY 24 HOURS
Refills: 0 | Status: COMPLETED | OUTPATIENT
Start: 2023-12-27 | End: 2023-12-29

## 2023-12-27 RX ADMIN — Medication 1000 MILLIGRAM(S): at 02:05

## 2023-12-27 RX ADMIN — BENZOCAINE AND MENTHOL 1 LOZENGE: 5; 1 LIQUID ORAL at 06:50

## 2023-12-27 RX ADMIN — Medication 400 MILLIGRAM(S): at 01:05

## 2023-12-27 RX ADMIN — SODIUM CHLORIDE 100 MILLILITER(S): 9 INJECTION, SOLUTION INTRAVENOUS at 05:44

## 2023-12-27 RX ADMIN — SODIUM CHLORIDE 100 MILLILITER(S): 9 INJECTION, SOLUTION INTRAVENOUS at 12:16

## 2023-12-27 RX ADMIN — HEPARIN SODIUM 5000 UNIT(S): 5000 INJECTION INTRAVENOUS; SUBCUTANEOUS at 05:44

## 2023-12-27 RX ADMIN — HEPARIN SODIUM 5000 UNIT(S): 5000 INJECTION INTRAVENOUS; SUBCUTANEOUS at 17:05

## 2023-12-27 RX ADMIN — Medication 650 MILLIGRAM(S): at 12:17

## 2023-12-27 RX ADMIN — Medication 650 MILLIGRAM(S): at 13:00

## 2023-12-27 RX ADMIN — CEFTRIAXONE 100 MILLIGRAM(S): 500 INJECTION, POWDER, FOR SOLUTION INTRAMUSCULAR; INTRAVENOUS at 12:16

## 2023-12-27 NOTE — PROGRESS NOTE ADULT - SUBJECTIVE AND OBJECTIVE BOX
Patient is a 78y old  Female who presents with a chief complaint of     INTERVAL HPI/OVERNIGHT EVENTS: No acute events overnight.     MEDICATIONS  (STANDING):  cefTRIAXone   IVPB 1000 milliGRAM(s) IV Intermittent every 24 hours  heparin   Injectable 5000 Unit(s) SubCutaneous every 8 hours  influenza  Vaccine (HIGH DOSE) 0.7 milliLiter(s) IntraMuscular once  lactated ringers. 1000 milliLiter(s) (100 mL/Hr) IV Continuous <Continuous>    MEDICATIONS  (PRN):  acetaminophen     Tablet .. 650 milliGRAM(s) Oral every 6 hours PRN Temp greater or equal to 38C (100.4F), Mild Pain (1 - 3)  aluminum hydroxide/magnesium hydroxide/simethicone Suspension 30 milliLiter(s) Oral every 4 hours PRN Dyspepsia  melatonin 3 milliGRAM(s) Oral at bedtime PRN Insomnia  ondansetron Injectable 4 milliGRAM(s) IV Push every 8 hours PRN Nausea and/or Vomiting      Allergies    No Known Allergies    Intolerances        REVIEW OF SYSTEMS:  CONSTITUTIONAL: +ve for fatigue  EYES: No eye pain, visual disturbances, or discharge  ENMT:  No difficulty hearing, tinnitus, vertigo; No sinus or throat pain  NECK: No pain or stiffness      Vital Signs Last 24 Hrs  T(C): 38.4 (27 Dec 2023 11:03), Max: 39.1 (26 Dec 2023 23:30)  T(F): 101.1 (27 Dec 2023 11:03), Max: 102.4 (26 Dec 2023 23:30)  HR: 97 (27 Dec 2023 11:03) (86 - 97)  BP: 120/73 (27 Dec 2023 11:03) (120/73 - 162/76)  BP(mean): --  RR: 18 (27 Dec 2023 11:03) (18 - 18)  SpO2: 97% (27 Dec 2023 11:03) (95% - 98%)    Parameters below as of 27 Dec 2023 11:03  Patient On (Oxygen Delivery Method): room air        PHYSICAL EXAM:    HEAD:  Atraumatic, Normocephalic  EYES: EOMI, PERRLA, conjunctiva and sclera clear  ENMT: No tonsillar erythema, exudates, or enlargement; Moist mucous membranes, Good dentition, No lesions  NECK: Supple, No JVD, Normal thyroid      LABS:                        11.2   8.18  )-----------( 188      ( 27 Dec 2023 05:42 )             34.5     12-27    138  |  107  |  12  ----------------------------<  86  3.8   |  25  |  0.71    Ca    8.7      27 Dec 2023 05:42  Phos  2.9     12-27  Mg     1.9     12-27    TPro  6.6  /  Alb  2.7<L>  /  TBili  1.0  /  DBili  x   /  AST  52<H>  /  ALT  61  /  AlkPhos  104  12-27      Urinalysis Basic - ( 27 Dec 2023 07:50 )    Color: Yellow / Appearance: Clear / SG: <1.005 / pH: x  Gluc: x / Ketone: Negative mg/dL  / Bili: Negative / Urobili: 0.2 mg/dL   Blood: x / Protein: Trace mg/dL / Nitrite: Negative   Leuk Esterase: Moderate / RBC: 3-5 /HPF / WBC 15 /HPF   Sq Epi: x / Non Sq Epi: x / Bacteria: Many /HPF

## 2023-12-27 NOTE — PROGRESS NOTE ADULT - ASSESSMENT
A/P    1. Acute pancreatitis:  - Pain improved  - IV fluids  - PO clears  - Surgery following    2. HTN:  - Well controlled    3. UTI:  - IV ceftriaxone (day 1/3)    Geraldine Arriola MD

## 2023-12-27 NOTE — PROGRESS NOTE ADULT - SUBJECTIVE AND OBJECTIVE BOX
SURGERY PA NOTE ON BEHALF OF DR. NOONAN:    S: Patient seen and examined at bedside.  Febrile to 102 noted overnight, work up in progress.  Patient endorses no fevers this am.  Patient reports improved abdominal pain, no flatus or BMs.  Denies fevers, chills, chest pain, SOB, palpitations, calf pain.      MEDICATIONS:  acetaminophen     Tablet .. 650 milliGRAM(s) Oral every 6 hours PRN  aluminum hydroxide/magnesium hydroxide/simethicone Suspension 30 milliLiter(s) Oral every 4 hours PRN  cefTRIAXone   IVPB 1000 milliGRAM(s) IV Intermittent every 24 hours  heparin   Injectable 5000 Unit(s) SubCutaneous every 8 hours  influenza  Vaccine (HIGH DOSE) 0.7 milliLiter(s) IntraMuscular once  lactated ringers. 1000 milliLiter(s) IV Continuous <Continuous>  melatonin 3 milliGRAM(s) Oral at bedtime PRN  ondansetron Injectable 4 milliGRAM(s) IV Push every 8 hours PRN      O:  Vital Signs Last 24 Hrs  T(C): 38.4 (27 Dec 2023 11:03), Max: 39.1 (26 Dec 2023 23:30)  T(F): 101.1 (27 Dec 2023 11:03), Max: 102.4 (26 Dec 2023 23:30)  HR: 97 (27 Dec 2023 11:03) (86 - 97)  BP: 120/73 (27 Dec 2023 11:03) (120/73 - 162/76)  BP(mean): --  RR: 18 (27 Dec 2023 11:03) (18 - 18)  SpO2: 97% (27 Dec 2023 11:03) (95% - 98%)    Parameters below as of 27 Dec 2023 11:03  Patient On (Oxygen Delivery Method): room air        I&O SUMMARY:      PHYSICAL EXAM:  Lungs: CTA bilat without W/R/R  Card: S1S2  Abd: Soft, Mild epigastric tenderness, ND.  +BS x 4.  No rebound/guarding.    Ext: Calves soft, NT, without edema bilat    LABS:                        11.2   8.18  )-----------( 188      ( 27 Dec 2023 05:42 )             34.5     12-27    138  |  107  |  12  ----------------------------<  86  3.8   |  25  |  0.71    Ca    8.7      27 Dec 2023 05:42  Phos  2.9     12-27  Mg     1.9     12-27    TPro  6.6  /  Alb  2.7<L>  /  TBili  1.0  /  DBili  x   /  AST  52<H>  /  ALT  61  /  AlkPhos  104  12-27          RADIOLOGY:  < from: MR MRCP w/wo IV Cont (12.26.23 @ 16:34) >    ACC: [id number] EXAM:  MR MRCP WAW IC   ORDERED BY: ERICA IBARRA     PROCEDURE DATE:  12/26/2023          INTERPRETATION:  CLINICAL INFORMATION: Severe acute pancreatitis    COMPARISON: Same date gallbladder ultrasound and CT abdomen and pelvis   one day prior    CONTRAST/COMPLICATIONS:  IV Contrast: Gadavist  5 cc administered   5 cc discarded  Oral Contrast: NONE  Complications: None reported at time of study completion    PROCEDURE:  MRI of the abdomen was performed.  MRCP was performed.    FINDINGS:  LOWER CHEST: Clear.    LIVER: Normal.  BILE DUCTS: 8 mm common bile duct with normal distal tapering. No filling   defect. No intrahepatic dilatation.  GALLBLADDER: A few small stones without wall thickening or signs of   inflammation.  SPLEEN: Normal.  PANCREAS: Pancreatic and peripancreatic edema with mild acute   peripancreatic fluid collections. No necrosis. No main duct dilatation or   mass lesion.  ADRENALS: Normal.  KIDNEYS/URETERS: Symmetric nephrograms. No hydronephrosis.    VISUALIZED PORTIONS:  BOWEL: No dilated bowel.  PERITONEUM: Trace ascites.  VESSELS: Aortic atherosclerosis without aneurysm.  RETROPERITONEUM/LYMPH NODES: No adenopathy.  ABDOMINAL WALL: Normal.  BONES: No aggressive lesion.    IMPRESSION:  *  Interstitial edematous pancreatitis with mild acute peripancreatic   fluid collections.  *  Small gallstones without biliary dilatation or choledocholithiasis        LUIS ALBERTO ARIAS MD; Attending Radiologist  This document has beenelectronically signed. Dec 26 2023  5:33PM    < end of copied text >      Assessment:  78F with abdominal pain, leukocytosis, elevated lipase  CT a/p shows perpancreatic fluid and stranding.  suggestive of acute pancreatitis.  Also dilated CBD. cholelithiasis  concern for gallstone pancreatitis.  MRCP performed showing  Interstitial edematous pancreatitis with mild acute peripancreatic   fluid collections.  Small gallstones without biliary dilatation or choledocholithiasis.  Febrile overnight, and this am.  No leukocytosis, normalized LFTs.  Abdominal exam reassuring      Plan:  - Clear liquid diet today  - cholecystectomy when optimized  - Remainder of care per primary team  - discussed with Dr. Noonan     SURGERY PA NOTE ON BEHALF OF DR. NOONAN:    S: Patient seen and examined at bedside.  Febrile to 102 noted overnight, work up in progress.  Patient endorses no fevers this am.  Patient reports improved abdominal pain, no flatus or BMs.  Denies fevers, chills, chest pain, SOB, palpitations, calf pain.      MEDICATIONS:  acetaminophen     Tablet .. 650 milliGRAM(s) Oral every 6 hours PRN  aluminum hydroxide/magnesium hydroxide/simethicone Suspension 30 milliLiter(s) Oral every 4 hours PRN  cefTRIAXone   IVPB 1000 milliGRAM(s) IV Intermittent every 24 hours  heparin   Injectable 5000 Unit(s) SubCutaneous every 8 hours  influenza  Vaccine (HIGH DOSE) 0.7 milliLiter(s) IntraMuscular once  lactated ringers. 1000 milliLiter(s) IV Continuous <Continuous>  melatonin 3 milliGRAM(s) Oral at bedtime PRN  ondansetron Injectable 4 milliGRAM(s) IV Push every 8 hours PRN      O:  Vital Signs Last 24 Hrs  T(C): 38.4 (27 Dec 2023 11:03), Max: 39.1 (26 Dec 2023 23:30)  T(F): 101.1 (27 Dec 2023 11:03), Max: 102.4 (26 Dec 2023 23:30)  HR: 97 (27 Dec 2023 11:03) (86 - 97)  BP: 120/73 (27 Dec 2023 11:03) (120/73 - 162/76)  BP(mean): --  RR: 18 (27 Dec 2023 11:03) (18 - 18)  SpO2: 97% (27 Dec 2023 11:03) (95% - 98%)    Parameters below as of 27 Dec 2023 11:03  Patient On (Oxygen Delivery Method): room air        I&O SUMMARY:      PHYSICAL EXAM:  Lungs: CTA bilat without W/R/R  Card: S1S2  Abd: Soft, Mild epigastric tenderness, ND.  +BS x 4.  No rebound/guarding.    Ext: Calves soft, NT, without edema bilat    LABS:                        11.2   8.18  )-----------( 188      ( 27 Dec 2023 05:42 )             34.5     12-27    138  |  107  |  12  ----------------------------<  86  3.8   |  25  |  0.71    Ca    8.7      27 Dec 2023 05:42  Phos  2.9     12-27  Mg     1.9     12-27    TPro  6.6  /  Alb  2.7<L>  /  TBili  1.0  /  DBili  x   /  AST  52<H>  /  ALT  61  /  AlkPhos  104  12-27          RADIOLOGY:  < from: MR MRCP w/wo IV Cont (12.26.23 @ 16:34) >    ACC: [id number] EXAM:  MR MRCP WAW IC   ORDERED BY: ERICA IBARRA     PROCEDURE DATE:  12/26/2023          INTERPRETATION:  CLINICAL INFORMATION: Severe acute pancreatitis    COMPARISON: Same date gallbladder ultrasound and CT abdomen and pelvis   one day prior    CONTRAST/COMPLICATIONS:  IV Contrast: Gadavist  5 cc administered   5 cc discarded  Oral Contrast: NONE  Complications: None reported at time of study completion    PROCEDURE:  MRI of the abdomen was performed.  MRCP was performed.    FINDINGS:  LOWER CHEST: Clear.    LIVER: Normal.  BILE DUCTS: 8 mm common bile duct with normal distal tapering. No filling   defect. No intrahepatic dilatation.  GALLBLADDER: A few small stones without wall thickening or signs of   inflammation.  SPLEEN: Normal.  PANCREAS: Pancreatic and peripancreatic edema with mild acute   peripancreatic fluid collections. No necrosis. No main duct dilatation or   mass lesion.  ADRENALS: Normal.  KIDNEYS/URETERS: Symmetric nephrograms. No hydronephrosis.    VISUALIZED PORTIONS:  BOWEL: No dilated bowel.  PERITONEUM: Trace ascites.  VESSELS: Aortic atherosclerosis without aneurysm.  RETROPERITONEUM/LYMPH NODES: No adenopathy.  ABDOMINAL WALL: Normal.  BONES: No aggressive lesion.    IMPRESSION:  *  Interstitial edematous pancreatitis with mild acute peripancreatic   fluid collections.  *  Small gallstones without biliary dilatation or choledocholithiasis        LUIS ALBERTO ARIAS MD; Attending Radiologist  This document has beenelectronically signed. Dec 26 2023  5:33PM    < end of copied text >      Assessment:  78F with abdominal pain, leukocytosis, elevated lipase  CT a/p shows perpancreatic fluid and stranding.  suggestive of acute pancreatitis.  Also dilated CBD. cholelithiasis  concern for gallstone pancreatitis.  MRCP performed showing  Interstitial edematous pancreatitis with mild acute peripancreatic   fluid collections.  Small gallstones without biliary dilatation or choledocholithiasis.  Febrile overnight, and this am.  No leukocytosis, normalized LFTs.  Abdominal exam reassuring      Plan:  - Clear liquid diet today  - cholecystectomy when optimized  - Remainder of care per primary team  - discussed with Dr. Noonan

## 2023-12-28 PROCEDURE — 99232 SBSQ HOSP IP/OBS MODERATE 35: CPT

## 2023-12-28 RX ORDER — ALBUTEROL 90 UG/1
2 AEROSOL, METERED ORAL EVERY 6 HOURS
Refills: 0 | Status: DISCONTINUED | OUTPATIENT
Start: 2023-12-28 | End: 2024-01-04

## 2023-12-28 RX ADMIN — HEPARIN SODIUM 5000 UNIT(S): 5000 INJECTION INTRAVENOUS; SUBCUTANEOUS at 14:01

## 2023-12-28 RX ADMIN — CEFTRIAXONE 100 MILLIGRAM(S): 500 INJECTION, POWDER, FOR SOLUTION INTRAMUSCULAR; INTRAVENOUS at 11:16

## 2023-12-28 RX ADMIN — ALBUTEROL 2 PUFF(S): 90 AEROSOL, METERED ORAL at 16:49

## 2023-12-28 RX ADMIN — Medication 650 MILLIGRAM(S): at 16:49

## 2023-12-28 RX ADMIN — HEPARIN SODIUM 5000 UNIT(S): 5000 INJECTION INTRAVENOUS; SUBCUTANEOUS at 02:37

## 2023-12-28 RX ADMIN — Medication 100 MILLIGRAM(S): at 16:49

## 2023-12-28 RX ADMIN — Medication 650 MILLIGRAM(S): at 00:20

## 2023-12-28 RX ADMIN — HEPARIN SODIUM 5000 UNIT(S): 5000 INJECTION INTRAVENOUS; SUBCUTANEOUS at 21:34

## 2023-12-28 RX ADMIN — HEPARIN SODIUM 5000 UNIT(S): 5000 INJECTION INTRAVENOUS; SUBCUTANEOUS at 10:15

## 2023-12-28 NOTE — PROGRESS NOTE ADULT - ASSESSMENT
79 Y/O female with gallstone pancreatitis now improving. Patient febrile with a Tmax of 102.4.   PMHx of      PLAN:     - OR planning when fevers resolve   - Continue care per primary team   - Multimodal pain control  - OOB as tolerated, walking as tolerated; DVT PPx  - Discussed with Dr. Noonan

## 2023-12-28 NOTE — PROGRESS NOTE ADULT - SUBJECTIVE AND OBJECTIVE BOX
Patient seen and examined at bedside with Dr. Noonan offering no complaints.   Tolerating clear liquid diet.  Admits to flatus/BM.   Denies pain, nausea/ vomiting, chills, SOB, chest pain, calf tenderness.          Vital Signs Last 24 Hrs  T(F): 99.3 (12-28-23 @ 06:16), Max: 102.9 (12-27-23 @ 23:56)  HR: 88 (12-28-23 @ 05:06)  BP: 152/74 (12-28-23 @ 05:06)  RR: 18 (12-28-23 @ 05:06)  SpO2: 97% (12-28-23 @ 05:06)  Wt(kg): --   CAPILLARY BLOOD GLUCOSE          GENERAL: Alert, NAD  CHEST/LUNG: Respirations non labored, good inspiratory effort  HEART: S1S2  HEENT: NCAT, EOMI, conjunctiva clear   ABDOMEN: Nondistended, + bowel sounds, nontender  EXTREMITIES:  No calf tenderness, no edema    I&O's Detail      LABS:                        11.2   8.18  )-----------( 188      ( 27 Dec 2023 05:42 )             34.5     12-27    138  |  107  |  12  ----------------------------<  86  3.8   |  25  |  0.71    Ca    8.7      27 Dec 2023 05:42  Phos  2.9     12-27  Mg     1.9     12-27    TPro  6.6  /  Alb  2.7<L>  /  TBili  1.0  /  DBili  x   /  AST  52<H>  /  ALT  61  /  AlkPhos  104  12-27        RADIOLOGY & ADDITIONAL STUDIES:    < from: MR MRCP w/wo IV Cont (12.26.23 @ 16:34) >    ACC: [id number] EXAM:  MR MRCP WAW IC   ORDERED BY: ERICA IBARRA     PROCEDURE DATE:  12/26/2023          INTERPRETATION:  CLINICAL INFORMATION: Severe acute pancreatitis    COMPARISON: Same date gallbladder ultrasound and CT abdomen and pelvis   one day prior    CONTRAST/COMPLICATIONS:  IV Contrast: Gadavist  5 cc administered   5 cc discarded  Oral Contrast: NONE  Complications: None reported at time of study completion    PROCEDURE:  MRI of the abdomen was performed.  MRCP was performed.    FINDINGS:  LOWER CHEST: Clear.    LIVER: Normal.  BILE DUCTS: 8 mm common bile duct with normal distal tapering. No filling   defect. No intrahepatic dilatation.  GALLBLADDER: A few small stones without wall thickening or signs of   inflammation.  SPLEEN: Normal.  PANCREAS: Pancreatic and peripancreatic edema with mild acute   peripancreatic fluid collections. No necrosis. No main duct dilatation or   mass lesion.  ADRENALS: Normal.  KIDNEYS/URETERS: Symmetric nephrograms. No hydronephrosis.    VISUALIZED PORTIONS:  BOWEL: No dilated bowel.  PERITONEUM: Trace ascites.  VESSELS: Aortic atherosclerosis without aneurysm.  RETROPERITONEUM/LYMPH NODES: No adenopathy.  ABDOMINAL WALL: Normal.  BONES: No aggressive lesion.    IMPRESSION:  *  Interstitial edematous pancreatitis with mild acute peripancreatic   fluid collections.  *  Small gallstones without biliary dilatation or choledocholithiasis.      --- End of Report ---            LUIS ALBERTO ARIAS MD; Attending Radiologist  This document has beenelectronically signed. Dec 26 2023  5:33PM    < end of copied text >         Patient seen and examined at bedside with Dr. Noonan offering no complaints.   Tolerating clear liquid diet.  Admits to flatus/BM.   Denies pain, nausea/ vomiting, chills, SOB, chest pain, calf tenderness.          Vital Signs Last 24 Hrs  T(F): 99.3 (12-28-23 @ 06:16), Max: 102.9 (12-27-23 @ 23:56)  HR: 88 (12-28-23 @ 05:06)  BP: 152/74 (12-28-23 @ 05:06)  RR: 18 (12-28-23 @ 05:06)  SpO2: 97% (12-28-23 @ 05:06)  Wt(kg): --   CAPILLARY BLOOD GLUCOSE          GENERAL: Alert, NAD  CHEST/LUNG: Respirations non labored, good inspiratory effort  HEART: S1S2  HEENT: NCAT, EOMI, conjunctiva clear   ABDOMEN: Nondistended, + bowel sounds, nontender  EXTREMITIES:  No calf tenderness, no edema    I&O's Detail      LABS:                        11.2   8.18  )-----------( 188      ( 27 Dec 2023 05:42 )             34.5     12-27    138  |  107  |  12  ----------------------------<  86  3.8   |  25  |  0.71    Ca    8.7      27 Dec 2023 05:42  Phos  2.9     12-27  Mg     1.9     12-27    TPro  6.6  /  Alb  2.7<L>  /  TBili  1.0  /  DBili  x   /  AST  52<H>  /  ALT  61  /  AlkPhos  104  12-27        RADIOLOGY & ADDITIONAL STUDIES:    < from: MR MRCP w/wo IV Cont (12.26.23 @ 16:34) >    ACC: [id number] EXAM:  MR MRCP WAW IC   ORDERED BY: ERICA IBARRA     PROCEDURE DATE:  12/26/2023          INTERPRETATION:  CLINICAL INFORMATION: Severe acute pancreatitis    COMPARISON: Same date gallbladder ultrasound and CT abdomen and pelvis   one day prior    CONTRAST/COMPLICATIONS:  IV Contrast: Gadavist  5 cc administered   5 cc discarded  Oral Contrast: NONE  Complications: None reported at time of study completion    PROCEDURE:  MRI of the abdomen was performed.  MRCP was performed.    FINDINGS:  LOWER CHEST: Clear.    LIVER: Normal.  BILE DUCTS: 8 mm common bile duct with normal distal tapering. No filling   defect. No intrahepatic dilatation.  GALLBLADDER: A few small stones without wall thickening or signs of   inflammation.  SPLEEN: Normal.  PANCREAS: Pancreatic and peripancreatic edema with mild acute   peripancreatic fluid collections. No necrosis. No main duct dilatation or   mass lesion.  ADRENALS: Normal.  KIDNEYS/URETERS: Symmetric nephrograms. No hydronephrosis.    VISUALIZED PORTIONS:  BOWEL: No dilated bowel.  PERITONEUM: Trace ascites.  VESSELS: Aortic atherosclerosis without aneurysm.  RETROPERITONEUM/LYMPH NODES: No adenopathy.  ABDOMINAL WALL: Normal.  BONES: No aggressive lesion.    IMPRESSION:  *  Interstitial edematous pancreatitis with mild acute peripancreatic   fluid collections.  *  Small gallstones without biliary dilatation or choledocholithiasis.      --- End of Report ---            LUIS ALBERTO ARIAS MD; Attending Radiologist  This document has beenelectronically signed. Dec 26 2023  5:33PM    < end of copied text >

## 2023-12-28 NOTE — PROGRESS NOTE ADULT - SUBJECTIVE AND OBJECTIVE BOX
Patient is a 78y old  Female who presents with a chief complaint of     INTERVAL HPI/OVERNIGHT EVENTS: No acute events overnight. Febrile.     MEDICATIONS  (STANDING):  cefTRIAXone   IVPB 1000 milliGRAM(s) IV Intermittent every 24 hours  heparin   Injectable 5000 Unit(s) SubCutaneous every 8 hours  influenza  Vaccine (HIGH DOSE) 0.7 milliLiter(s) IntraMuscular once  lactated ringers. 1000 milliLiter(s) (100 mL/Hr) IV Continuous <Continuous>    MEDICATIONS  (PRN):  acetaminophen     Tablet .. 650 milliGRAM(s) Oral every 6 hours PRN Temp greater or equal to 38C (100.4F), Mild Pain (1 - 3)  aluminum hydroxide/magnesium hydroxide/simethicone Suspension 30 milliLiter(s) Oral every 4 hours PRN Dyspepsia  melatonin 3 milliGRAM(s) Oral at bedtime PRN Insomnia  ondansetron Injectable 4 milliGRAM(s) IV Push every 8 hours PRN Nausea and/or Vomiting      Allergies    No Known Allergies    Intolerances        REVIEW OF SYSTEMS:  CONSTITUTIONAL: +ve for fatigue  EYES: No eye pain, visual disturbances, or discharge  ENMT:  No difficulty hearing, tinnitus, vertigo; No sinus or throat pain  NECK: No pain or stiffness      Vital Signs Last 24 Hrs  T(C): 39.1 (28 Dec 2023 10:47), Max: 39.4 (27 Dec 2023 23:56)  T(F): 102.4 (28 Dec 2023 10:47), Max: 102.9 (27 Dec 2023 23:56)  HR: 96 (28 Dec 2023 10:47) (86 - 99)  BP: 164/64 (28 Dec 2023 10:47) (152/74 - 176/73)  BP(mean): --  RR: 18 (28 Dec 2023 10:47) (17 - 18)  SpO2: 98% (28 Dec 2023 10:47) (96% - 98%)    Parameters below as of 28 Dec 2023 10:47  Patient On (Oxygen Delivery Method): room air        PHYSICAL EXAM:    HEAD:  Atraumatic, Normocephalic  EYES: EOMI, PERRLA, conjunctiva and sclera clear  ENMT: No tonsillar erythema, exudates, or enlargement; Moist mucous membranes, Good dentition, No lesions  NECK: Supple, No JVD, Normal thyroid  NERVOUS SYSTEM:  Alert & Oriented X3    LABS:                        11.2   8.18  )-----------( 188      ( 27 Dec 2023 05:42 )             34.5     12-27    138  |  107  |  12  ----------------------------<  86  3.8   |  25  |  0.71    Ca    8.7      27 Dec 2023 05:42  Phos  2.9     12-27  Mg     1.9     12-27    TPro  6.6  /  Alb  2.7<L>  /  TBili  1.0  /  DBili  x   /  AST  52<H>  /  ALT  61  /  AlkPhos  104  12-27      Urinalysis Basic - ( 27 Dec 2023 07:50 )    Color: Yellow / Appearance: Clear / SG: <1.005 / pH: x  Gluc: x / Ketone: Negative mg/dL  / Bili: Negative / Urobili: 0.2 mg/dL   Blood: x / Protein: Trace mg/dL / Nitrite: Negative   Leuk Esterase: Moderate / RBC: 3-5 /HPF / WBC 15 /HPF   Sq Epi: x / Non Sq Epi: x / Bacteria: Many /HPF

## 2023-12-28 NOTE — PROGRESS NOTE ADULT - ASSESSMENT
A/P    1. Acute pancreatitis:  - Pain improved  - IV fluids  - PO clears  - Surgery following  - OR once fevers resolve    2. HTN:  - Well controlled    3. UTI:  - IV ceftriaxone (day 2/3)    Geraldine Arriola MD

## 2023-12-29 LAB
ALBUMIN SERPL ELPH-MCNC: 2.2 G/DL — LOW (ref 3.3–5)
ALBUMIN SERPL ELPH-MCNC: 2.2 G/DL — LOW (ref 3.3–5)
ALP SERPL-CCNC: 141 U/L — HIGH (ref 40–120)
ALP SERPL-CCNC: 141 U/L — HIGH (ref 40–120)
ALT FLD-CCNC: 34 U/L — SIGNIFICANT CHANGE UP (ref 12–78)
ALT FLD-CCNC: 34 U/L — SIGNIFICANT CHANGE UP (ref 12–78)
ANION GAP SERPL CALC-SCNC: 8 MMOL/L — SIGNIFICANT CHANGE UP (ref 5–17)
ANION GAP SERPL CALC-SCNC: 8 MMOL/L — SIGNIFICANT CHANGE UP (ref 5–17)
AST SERPL-CCNC: 20 U/L — SIGNIFICANT CHANGE UP (ref 15–37)
AST SERPL-CCNC: 20 U/L — SIGNIFICANT CHANGE UP (ref 15–37)
BILIRUB SERPL-MCNC: 0.7 MG/DL — SIGNIFICANT CHANGE UP (ref 0.2–1.2)
BILIRUB SERPL-MCNC: 0.7 MG/DL — SIGNIFICANT CHANGE UP (ref 0.2–1.2)
BUN SERPL-MCNC: 7 MG/DL — SIGNIFICANT CHANGE UP (ref 7–23)
BUN SERPL-MCNC: 7 MG/DL — SIGNIFICANT CHANGE UP (ref 7–23)
CALCIUM SERPL-MCNC: 8.1 MG/DL — LOW (ref 8.5–10.1)
CALCIUM SERPL-MCNC: 8.1 MG/DL — LOW (ref 8.5–10.1)
CHLORIDE SERPL-SCNC: 104 MMOL/L — SIGNIFICANT CHANGE UP (ref 96–108)
CHLORIDE SERPL-SCNC: 104 MMOL/L — SIGNIFICANT CHANGE UP (ref 96–108)
CO2 SERPL-SCNC: 24 MMOL/L — SIGNIFICANT CHANGE UP (ref 22–31)
CO2 SERPL-SCNC: 24 MMOL/L — SIGNIFICANT CHANGE UP (ref 22–31)
CREAT SERPL-MCNC: 0.53 MG/DL — SIGNIFICANT CHANGE UP (ref 0.5–1.3)
CREAT SERPL-MCNC: 0.53 MG/DL — SIGNIFICANT CHANGE UP (ref 0.5–1.3)
EGFR: 96 ML/MIN/1.73M2 — SIGNIFICANT CHANGE UP
EGFR: 96 ML/MIN/1.73M2 — SIGNIFICANT CHANGE UP
GLUCOSE SERPL-MCNC: 141 MG/DL — HIGH (ref 70–99)
GLUCOSE SERPL-MCNC: 141 MG/DL — HIGH (ref 70–99)
HCT VFR BLD CALC: 29.8 % — LOW (ref 34.5–45)
HCT VFR BLD CALC: 29.8 % — LOW (ref 34.5–45)
HGB BLD-MCNC: 9.9 G/DL — LOW (ref 11.5–15.5)
HGB BLD-MCNC: 9.9 G/DL — LOW (ref 11.5–15.5)
MCHC RBC-ENTMCNC: 29.7 PG — SIGNIFICANT CHANGE UP (ref 27–34)
MCHC RBC-ENTMCNC: 29.7 PG — SIGNIFICANT CHANGE UP (ref 27–34)
MCHC RBC-ENTMCNC: 33.2 G/DL — SIGNIFICANT CHANGE UP (ref 32–36)
MCHC RBC-ENTMCNC: 33.2 G/DL — SIGNIFICANT CHANGE UP (ref 32–36)
MCV RBC AUTO: 89.5 FL — SIGNIFICANT CHANGE UP (ref 80–100)
MCV RBC AUTO: 89.5 FL — SIGNIFICANT CHANGE UP (ref 80–100)
NRBC # BLD: 0 /100 WBCS — SIGNIFICANT CHANGE UP (ref 0–0)
NRBC # BLD: 0 /100 WBCS — SIGNIFICANT CHANGE UP (ref 0–0)
PLATELET # BLD AUTO: 185 K/UL — SIGNIFICANT CHANGE UP (ref 150–400)
PLATELET # BLD AUTO: 185 K/UL — SIGNIFICANT CHANGE UP (ref 150–400)
POTASSIUM SERPL-MCNC: 3.4 MMOL/L — LOW (ref 3.5–5.3)
POTASSIUM SERPL-MCNC: 3.4 MMOL/L — LOW (ref 3.5–5.3)
POTASSIUM SERPL-SCNC: 3.4 MMOL/L — LOW (ref 3.5–5.3)
POTASSIUM SERPL-SCNC: 3.4 MMOL/L — LOW (ref 3.5–5.3)
PROT SERPL-MCNC: 6 GM/DL — SIGNIFICANT CHANGE UP (ref 6–8.3)
PROT SERPL-MCNC: 6 GM/DL — SIGNIFICANT CHANGE UP (ref 6–8.3)
RBC # BLD: 3.33 M/UL — LOW (ref 3.8–5.2)
RBC # BLD: 3.33 M/UL — LOW (ref 3.8–5.2)
RBC # FLD: 13 % — SIGNIFICANT CHANGE UP (ref 10.3–14.5)
RBC # FLD: 13 % — SIGNIFICANT CHANGE UP (ref 10.3–14.5)
SODIUM SERPL-SCNC: 136 MMOL/L — SIGNIFICANT CHANGE UP (ref 135–145)
SODIUM SERPL-SCNC: 136 MMOL/L — SIGNIFICANT CHANGE UP (ref 135–145)
WBC # BLD: 7.49 K/UL — SIGNIFICANT CHANGE UP (ref 3.8–10.5)
WBC # BLD: 7.49 K/UL — SIGNIFICANT CHANGE UP (ref 3.8–10.5)
WBC # FLD AUTO: 7.49 K/UL — SIGNIFICANT CHANGE UP (ref 3.8–10.5)
WBC # FLD AUTO: 7.49 K/UL — SIGNIFICANT CHANGE UP (ref 3.8–10.5)

## 2023-12-29 PROCEDURE — 99232 SBSQ HOSP IP/OBS MODERATE 35: CPT

## 2023-12-29 RX ORDER — POTASSIUM CHLORIDE 20 MEQ
40 PACKET (EA) ORAL ONCE
Refills: 0 | Status: COMPLETED | OUTPATIENT
Start: 2023-12-29 | End: 2023-12-29

## 2023-12-29 RX ORDER — POTASSIUM CHLORIDE 20 MEQ
20 PACKET (EA) ORAL ONCE
Refills: 0 | Status: COMPLETED | OUTPATIENT
Start: 2023-12-29 | End: 2023-12-29

## 2023-12-29 RX ORDER — LOPERAMIDE HCL 2 MG
2 TABLET ORAL ONCE
Refills: 0 | Status: COMPLETED | OUTPATIENT
Start: 2023-12-29 | End: 2023-12-29

## 2023-12-29 RX ADMIN — Medication 40 MILLIEQUIVALENT(S): at 08:00

## 2023-12-29 RX ADMIN — SODIUM CHLORIDE 100 MILLILITER(S): 9 INJECTION, SOLUTION INTRAVENOUS at 05:04

## 2023-12-29 RX ADMIN — HEPARIN SODIUM 5000 UNIT(S): 5000 INJECTION INTRAVENOUS; SUBCUTANEOUS at 05:02

## 2023-12-29 RX ADMIN — Medication 20 MILLIEQUIVALENT(S): at 11:00

## 2023-12-29 RX ADMIN — CEFTRIAXONE 100 MILLIGRAM(S): 500 INJECTION, POWDER, FOR SOLUTION INTRAMUSCULAR; INTRAVENOUS at 10:59

## 2023-12-29 RX ADMIN — HEPARIN SODIUM 5000 UNIT(S): 5000 INJECTION INTRAVENOUS; SUBCUTANEOUS at 14:34

## 2023-12-29 RX ADMIN — Medication 650 MILLIGRAM(S): at 05:00

## 2023-12-29 RX ADMIN — Medication 2 MILLIGRAM(S): at 17:19

## 2023-12-29 RX ADMIN — SODIUM CHLORIDE 100 MILLILITER(S): 9 INJECTION, SOLUTION INTRAVENOUS at 18:41

## 2023-12-29 RX ADMIN — HEPARIN SODIUM 5000 UNIT(S): 5000 INJECTION INTRAVENOUS; SUBCUTANEOUS at 21:35

## 2023-12-29 NOTE — PROGRESS NOTE ADULT - ASSESSMENT
A/P    1. Acute pancreatitis:  - Pain improved  - IV fluids  - PO clears  - Surgery following  - OR once fevers resolve    2. HTN:  - Well controlled    3. UTI:  - IV ceftriaxone (day 3/3)    Geraldine Arriola MD

## 2023-12-29 NOTE — PROGRESS NOTE ADULT - SUBJECTIVE AND OBJECTIVE BOX
Patient seen and examined bedside resting comfortably.  No complaints offered.   Denies nausea, vomiting, diarrhea, fevers, chills. Tolerating clear liquid diet, but endorses low appetite         T(F): 98.7 (12-29-23 @ 06:31), Max: 103 (12-28-23 @ 16:40)  HR: 90 (12-29-23 @ 06:31) (90 - 99)  BP: 149/65 (12-29-23 @ 04:46) (149/65 - 174/73)  RR: 18 (12-29-23 @ 04:46) (18 - 18)  SpO2: 95% (12-29-23 @ 04:46) (95% - 98%)  Wt(kg): --  CAPILLARY BLOOD GLUCOSE          PHYSICAL EXAM:  General: NAD  Neuro:  Alert & oriented x 3  HEENT: NCAT, EOMI, conjunctiva clear  CV: +S1+S2 regular rate and rhythm  Lung:respirations nonlabored, good inspiratory effort  Abdomen: soft, minimally distended, nontender  Extremities: no pedal edema or calf tenderness noted     LABS:                        9.9    7.49  )-----------( 185      ( 29 Dec 2023 06:25 )             29.8     12-29    136  |  104  |  7   ----------------------------<  141<H>  3.4<L>   |  24  |  0.53    Ca    8.1<L>      29 Dec 2023 06:25    TPro  6.0  /  Alb  2.2<L>  /  TBili  0.7  /  DBili  x   /  AST  20  /  ALT  34  /  AlkPhos  141<H>  12-29        I&O:     Culture Results:   No growth at 24 hours (12-27 @ 05:42)      A/P: 79 yo F with gallstone pancreatitis. tmax 102  - advise repeating CT this weekend   - OR planning when fevers resolve   - Continue care per primary team   - Multimodal pain control  - OOB as tolerated, walking as tolerated; DVT PPx  - Discussed with Dr. Noonan   Patient seen and examined bedside resting comfortably.  No complaints offered.   Denies nausea, vomiting, diarrhea, fevers, chills. Tolerating clear liquid diet, but endorses low appetite         T(F): 98.7 (12-29-23 @ 06:31), Max: 103 (12-28-23 @ 16:40)  HR: 90 (12-29-23 @ 06:31) (90 - 99)  BP: 149/65 (12-29-23 @ 04:46) (149/65 - 174/73)  RR: 18 (12-29-23 @ 04:46) (18 - 18)  SpO2: 95% (12-29-23 @ 04:46) (95% - 98%)  Wt(kg): --  CAPILLARY BLOOD GLUCOSE          PHYSICAL EXAM:  General: NAD  Neuro:  Alert & oriented x 3  HEENT: NCAT, EOMI, conjunctiva clear  CV: +S1+S2 regular rate and rhythm  Lung:respirations nonlabored, good inspiratory effort  Abdomen: soft, minimally distended, nontender  Extremities: no pedal edema or calf tenderness noted     LABS:                        9.9    7.49  )-----------( 185      ( 29 Dec 2023 06:25 )             29.8     12-29    136  |  104  |  7   ----------------------------<  141<H>  3.4<L>   |  24  |  0.53    Ca    8.1<L>      29 Dec 2023 06:25    TPro  6.0  /  Alb  2.2<L>  /  TBili  0.7  /  DBili  x   /  AST  20  /  ALT  34  /  AlkPhos  141<H>  12-29        I&O:     Culture Results:   No growth at 24 hours (12-27 @ 05:42)      A/P: 77 yo F with gallstone pancreatitis. tmax 102  - advise repeating CT this weekend   - OR planning when fevers resolve   - Continue care per primary team   - Multimodal pain control  - OOB as tolerated, walking as tolerated; DVT PPx  - Discussed with Dr. Noonan

## 2023-12-29 NOTE — PROGRESS NOTE ADULT - SUBJECTIVE AND OBJECTIVE BOX
Patient is a 78y old  Female who presents with a chief complaint of     INTERVAL HPI/OVERNIGHT EVENTS: No acute events overnight, febrile again.     MEDICATIONS  (STANDING):  heparin   Injectable 5000 Unit(s) SubCutaneous every 8 hours  influenza  Vaccine (HIGH DOSE) 0.7 milliLiter(s) IntraMuscular once  lactated ringers. 1000 milliLiter(s) (100 mL/Hr) IV Continuous <Continuous>    MEDICATIONS  (PRN):  acetaminophen     Tablet .. 650 milliGRAM(s) Oral every 6 hours PRN Temp greater or equal to 38C (100.4F), Mild Pain (1 - 3)  albuterol    90 MICROgram(s) HFA Inhaler 2 Puff(s) Inhalation every 6 hours PRN Shortness of Breath and/or Wheezing  aluminum hydroxide/magnesium hydroxide/simethicone Suspension 30 milliLiter(s) Oral every 4 hours PRN Dyspepsia  guaiFENesin Oral Liquid (Sugar-Free) 100 milliGRAM(s) Oral every 6 hours PRN Cough  melatonin 3 milliGRAM(s) Oral at bedtime PRN Insomnia  ondansetron Injectable 4 milliGRAM(s) IV Push every 8 hours PRN Nausea and/or Vomiting      Allergies    No Known Allergies    Intolerances        REVIEW OF SYSTEMS:  CONSTITUTIONAL: +ve for fever  EYES: No eye pain, visual disturbances, or discharge  ENMT:  No difficulty hearing, tinnitus, vertigo; No sinus or throat pain  NECK: No pain or stiffness      Vital Signs Last 24 Hrs  T(C): 37.1 (29 Dec 2023 06:31), Max: 39.4 (28 Dec 2023 16:40)  T(F): 98.7 (29 Dec 2023 06:31), Max: 103 (28 Dec 2023 16:40)  HR: 90 (29 Dec 2023 06:31) (90 - 99)  BP: 149/65 (29 Dec 2023 04:46) (149/65 - 174/73)  BP(mean): --  RR: 18 (29 Dec 2023 04:46) (18 - 18)  SpO2: 95% (29 Dec 2023 04:46) (95% - 98%)    Parameters below as of 29 Dec 2023 04:46  Patient On (Oxygen Delivery Method): room air        PHYSICAL EXAM:    HEAD:  Atraumatic, Normocephalic  EYES: EOMI, PERRLA, conjunctiva and sclera clear  ENMT: No tonsillar erythema, exudates, or enlargement; Moist mucous membranes, Good dentition, No lesions  NECK: Supple, No JVD, Normal thyroid  NERVOUS SYSTEM:  Alert & Oriented X3    LABS:                        9.9    7.49  )-----------( 185      ( 29 Dec 2023 06:25 )             29.8     12-29    136  |  104  |  7   ----------------------------<  141<H>  3.4<L>   |  24  |  0.53    Ca    8.1<L>      29 Dec 2023 06:25    TPro  6.0  /  Alb  2.2<L>  /  TBili  0.7  /  DBili  x   /  AST  20  /  ALT  34  /  AlkPhos  141<H>  12-29      Urinalysis Basic - ( 29 Dec 2023 06:25 )    Color: x / Appearance: x / SG: x / pH: x  Gluc: 141 mg/dL / Ketone: x  / Bili: x / Urobili: x   Blood: x / Protein: x / Nitrite: x   Leuk Esterase: x / RBC: x / WBC x   Sq Epi: x / Non Sq Epi: x / Bacteria: x

## 2023-12-29 NOTE — PROGRESS NOTE ADULT - NS ATTEND AMEND GEN_ALL_CORE FT
pt with persistent fevers, would recommend re-imaging on Sunday to evaluate the progression of pancreatitis
epigastric tenderness improved, still with multiple fever episodes past 24 hours. Recommend repeat CT this weekend to re- evaluate pancreatitis
febrile overnight, but improved tenderness. Recommend starting on PO diet. OR planning once patient's pancreatitis symptoms resolve

## 2023-12-30 LAB
ALBUMIN SERPL ELPH-MCNC: 2.3 G/DL — LOW (ref 3.3–5)
ALBUMIN SERPL ELPH-MCNC: 2.3 G/DL — LOW (ref 3.3–5)
ALP SERPL-CCNC: 185 U/L — HIGH (ref 40–120)
ALP SERPL-CCNC: 185 U/L — HIGH (ref 40–120)
ALT FLD-CCNC: 30 U/L — SIGNIFICANT CHANGE UP (ref 12–78)
ALT FLD-CCNC: 30 U/L — SIGNIFICANT CHANGE UP (ref 12–78)
ANION GAP SERPL CALC-SCNC: 8 MMOL/L — SIGNIFICANT CHANGE UP (ref 5–17)
ANION GAP SERPL CALC-SCNC: 8 MMOL/L — SIGNIFICANT CHANGE UP (ref 5–17)
AST SERPL-CCNC: 18 U/L — SIGNIFICANT CHANGE UP (ref 15–37)
AST SERPL-CCNC: 18 U/L — SIGNIFICANT CHANGE UP (ref 15–37)
BILIRUB SERPL-MCNC: 0.5 MG/DL — SIGNIFICANT CHANGE UP (ref 0.2–1.2)
BILIRUB SERPL-MCNC: 0.5 MG/DL — SIGNIFICANT CHANGE UP (ref 0.2–1.2)
BUN SERPL-MCNC: 7 MG/DL — SIGNIFICANT CHANGE UP (ref 7–23)
BUN SERPL-MCNC: 7 MG/DL — SIGNIFICANT CHANGE UP (ref 7–23)
CALCIUM SERPL-MCNC: 8.7 MG/DL — SIGNIFICANT CHANGE UP (ref 8.5–10.1)
CALCIUM SERPL-MCNC: 8.7 MG/DL — SIGNIFICANT CHANGE UP (ref 8.5–10.1)
CHLORIDE SERPL-SCNC: 103 MMOL/L — SIGNIFICANT CHANGE UP (ref 96–108)
CHLORIDE SERPL-SCNC: 103 MMOL/L — SIGNIFICANT CHANGE UP (ref 96–108)
CO2 SERPL-SCNC: 26 MMOL/L — SIGNIFICANT CHANGE UP (ref 22–31)
CO2 SERPL-SCNC: 26 MMOL/L — SIGNIFICANT CHANGE UP (ref 22–31)
CREAT SERPL-MCNC: 0.5 MG/DL — SIGNIFICANT CHANGE UP (ref 0.5–1.3)
CREAT SERPL-MCNC: 0.5 MG/DL — SIGNIFICANT CHANGE UP (ref 0.5–1.3)
EGFR: 97 ML/MIN/1.73M2 — SIGNIFICANT CHANGE UP
EGFR: 97 ML/MIN/1.73M2 — SIGNIFICANT CHANGE UP
GLUCOSE SERPL-MCNC: 134 MG/DL — HIGH (ref 70–99)
GLUCOSE SERPL-MCNC: 134 MG/DL — HIGH (ref 70–99)
POTASSIUM SERPL-MCNC: 3.9 MMOL/L — SIGNIFICANT CHANGE UP (ref 3.5–5.3)
POTASSIUM SERPL-MCNC: 3.9 MMOL/L — SIGNIFICANT CHANGE UP (ref 3.5–5.3)
POTASSIUM SERPL-SCNC: 3.9 MMOL/L — SIGNIFICANT CHANGE UP (ref 3.5–5.3)
POTASSIUM SERPL-SCNC: 3.9 MMOL/L — SIGNIFICANT CHANGE UP (ref 3.5–5.3)
PROT SERPL-MCNC: 6.5 GM/DL — SIGNIFICANT CHANGE UP (ref 6–8.3)
PROT SERPL-MCNC: 6.5 GM/DL — SIGNIFICANT CHANGE UP (ref 6–8.3)
SODIUM SERPL-SCNC: 137 MMOL/L — SIGNIFICANT CHANGE UP (ref 135–145)
SODIUM SERPL-SCNC: 137 MMOL/L — SIGNIFICANT CHANGE UP (ref 135–145)

## 2023-12-30 PROCEDURE — 99232 SBSQ HOSP IP/OBS MODERATE 35: CPT

## 2023-12-30 PROCEDURE — 99233 SBSQ HOSP IP/OBS HIGH 50: CPT | Mod: FS

## 2023-12-30 RX ADMIN — HEPARIN SODIUM 5000 UNIT(S): 5000 INJECTION INTRAVENOUS; SUBCUTANEOUS at 05:44

## 2023-12-30 RX ADMIN — SODIUM CHLORIDE 100 MILLILITER(S): 9 INJECTION, SOLUTION INTRAVENOUS at 05:44

## 2023-12-30 RX ADMIN — HEPARIN SODIUM 5000 UNIT(S): 5000 INJECTION INTRAVENOUS; SUBCUTANEOUS at 14:14

## 2023-12-30 RX ADMIN — Medication 650 MILLIGRAM(S): at 16:17

## 2023-12-30 RX ADMIN — HEPARIN SODIUM 5000 UNIT(S): 5000 INJECTION INTRAVENOUS; SUBCUTANEOUS at 21:17

## 2023-12-30 RX ADMIN — Medication 650 MILLIGRAM(S): at 15:44

## 2023-12-30 NOTE — PROGRESS NOTE ADULT - SUBJECTIVE AND OBJECTIVE BOX
Patient seen and examined at bedside with Dr. Lieberman offering no complaints. Patient eating clear liquids and speaking with son, Du at bedside.   Tolerating clear liquid diet.  Admits to flatus/BM.   Denies pain, nausea/ vomiting, chills, SOB, chest pain, calf tenderness.          Vital Signs Last 24 Hrs  T(F): 98.8 (12-30-23 @ 12:29), Max: 99.7 (12-30-23 @ 05:27)  HR: 84 (12-30-23 @ 12:29)  BP: 152/70 (12-30-23 @ 12:29)  RR: 18 (12-30-23 @ 12:29)  SpO2: 98% (12-30-23 @ 12:29)  Wt(kg): --   CAPILLARY BLOOD GLUCOSE          GENERAL: Alert, NAD  CHEST/LUNG: Respirations non labored, good inspiratory effort  HEART: S1S2  HEENT: NCAT, EOMI, conjunctiva clear   ABDOMEN: Nondistended, + bowel sounds, nontender  EXTREMITIES:  No calf tenderness, no edema    I&O's Detail    29 Dec 2023 07:01  -  30 Dec 2023 07:00  --------------------------------------------------------  IN:  Total IN: 0 mL    OUT:    Voided (mL): 1000 mL  Total OUT: 1000 mL    Total NET: -1000 mL          LABS:                        9.9    7.49  )-----------( 185      ( 29 Dec 2023 06:25 )             29.8     12-30    137  |  103  |  7   ----------------------------<  134<H>  3.9   |  26  |  0.50    Ca    8.7      30 Dec 2023 06:00    TPro  6.5  /  Alb  2.3<L>  /  TBili  0.5  /  DBili  x   /  AST  18  /  ALT  30  /  AlkPhos  185<H>  12-30        RADIOLOGY & ADDITIONAL STUDIES:    < from: MR MRCP w/wo IV Cont (12.26.23 @ 16:34) >    ACC: [id number] EXAM:  MR MRCP WAW IC   ORDERED BY: ERICA IBARRA     PROCEDURE DATE:  12/26/2023          INTERPRETATION:  CLINICAL INFORMATION: Severe acute pancreatitis    COMPARISON: Same date gallbladder ultrasound and CT abdomen and pelvis   one day prior    CONTRAST/COMPLICATIONS:  IV Contrast: Gadavist  5 cc administered   5 cc discarded  Oral Contrast: NONE  Complications: None reported at time of study completion    PROCEDURE:  MRI of the abdomen was performed.  MRCP was performed.    FINDINGS:  LOWER CHEST: Clear.    LIVER: Normal.  BILE DUCTS: 8 mm common bile duct with normal distal tapering. No filling   defect. No intrahepatic dilatation.  GALLBLADDER: A few small stones without wall thickening or signs of   inflammation.  SPLEEN: Normal.  PANCREAS: Pancreatic and peripancreatic edema with mild acute   peripancreatic fluid collections. No necrosis. No main duct dilatation or   mass lesion.  ADRENALS: Normal.  KIDNEYS/URETERS: Symmetric nephrograms. No hydronephrosis.    VISUALIZED PORTIONS:  BOWEL: No dilated bowel.  PERITONEUM: Trace ascites.  VESSELS: Aortic atherosclerosis without aneurysm.  RETROPERITONEUM/LYMPH NODES: No adenopathy.  ABDOMINAL WALL: Normal.  BONES: No aggressive lesion.    IMPRESSION:  *  Interstitial edematous pancreatitis with mild acute peripancreatic   fluid collections.  *  Small gallstones without biliary dilatation or choledocholithiasis.      --- End of Report ---            LUIS ALBERTO ARIAS MD; Attending Radiologist  This document has beenelectronically signed. Dec 26 2023  5:33PM    < end of copied text >         Patient seen and examined at bedside with Dr. Lieberman offering no complaints. Patient eating clear liquids and speaking with son, Du at bedside.   Tolerating clear liquid diet.  Admits to flatus/BM.   Denies pain, nausea/ vomiting, chills, SOB, chest pain, calf tenderness.          Vital Signs Last 24 Hrs  T(F): 98.8 (12-30-23 @ 12:29), Max: 99.7 (12-30-23 @ 05:27)  HR: 84 (12-30-23 @ 12:29)  BP: 152/70 (12-30-23 @ 12:29)  RR: 18 (12-30-23 @ 12:29)  SpO2: 98% (12-30-23 @ 12:29)  Wt(kg): --   CAPILLARY BLOOD GLUCOSE          GENERAL: Alert, NAD  CHEST/LUNG: Respirations non labored, good inspiratory effort  HEART: S1S2  HEENT: NCAT, EOMI, conjunctiva clear   ABDOMEN: Nondistended, + bowel sounds, nontender  EXTREMITIES:  No calf tenderness, no edema    I&O's Detail    29 Dec 2023 07:01  -  30 Dec 2023 07:00  --------------------------------------------------------  IN:  Total IN: 0 mL    OUT:    Voided (mL): 1000 mL  Total OUT: 1000 mL    Total NET: -1000 mL          LABS:                        9.9    7.49  )-----------( 185      ( 29 Dec 2023 06:25 )             29.8     12-30    137  |  103  |  7   ----------------------------<  134<H>  3.9   |  26  |  0.50    Ca    8.7      30 Dec 2023 06:00    TPro  6.5  /  Alb  2.3<L>  /  TBili  0.5  /  DBili  x   /  AST  18  /  ALT  30  /  AlkPhos  185<H>  12-30        RADIOLOGY & ADDITIONAL STUDIES:    < from: MR MRCP w/wo IV Cont (12.26.23 @ 16:34) >    ACC: [id number] EXAM:  MR MRCP WAW IC   ORDERED BY: ERICA IBARRA     PROCEDURE DATE:  12/26/2023          INTERPRETATION:  CLINICAL INFORMATION: Severe acute pancreatitis    COMPARISON: Same date gallbladder ultrasound and CT abdomen and pelvis   one day prior    CONTRAST/COMPLICATIONS:  IV Contrast: Gadavist  5 cc administered   5 cc discarded  Oral Contrast: NONE  Complications: None reported at time of study completion    PROCEDURE:  MRI of the abdomen was performed.  MRCP was performed.    FINDINGS:  LOWER CHEST: Clear.    LIVER: Normal.  BILE DUCTS: 8 mm common bile duct with normal distal tapering. No filling   defect. No intrahepatic dilatation.  GALLBLADDER: A few small stones without wall thickening or signs of   inflammation.  SPLEEN: Normal.  PANCREAS: Pancreatic and peripancreatic edema with mild acute   peripancreatic fluid collections. No necrosis. No main duct dilatation or   mass lesion.  ADRENALS: Normal.  KIDNEYS/URETERS: Symmetric nephrograms. No hydronephrosis.    VISUALIZED PORTIONS:  BOWEL: No dilated bowel.  PERITONEUM: Trace ascites.  VESSELS: Aortic atherosclerosis without aneurysm.  RETROPERITONEUM/LYMPH NODES: No adenopathy.  ABDOMINAL WALL: Normal.  BONES: No aggressive lesion.    IMPRESSION:  *  Interstitial edematous pancreatitis with mild acute peripancreatic   fluid collections.  *  Small gallstones without biliary dilatation or choledocholithiasis.      --- End of Report ---            LUIS ALBERTO ARIAS MD; Attending Radiologist  This document has beenelectronically signed. Dec 26 2023  5:33PM    < end of copied text >         Patient seen and examined at bedside with Dr. Lieberman offering no complaints. Patient eating clear liquids and speaking with son, Du at bedside.   Tolerating clear liquid diet.  Admits to flatus/BM.   Denies pain, nausea/ vomiting, chills, SOB, chest pain, calf tenderness.          Vital Signs Last 24 Hrs  T(F): 98.8 (12-30-23 @ 12:29), Max: 99.7 (12-30-23 @ 05:27)  HR: 84 (12-30-23 @ 12:29)  BP: 152/70 (12-30-23 @ 12:29)  RR: 18 (12-30-23 @ 12:29)  SpO2: 98% (12-30-23 @ 12:29)  Wt(kg): --   CAPILLARY BLOOD GLUCOSE          GENERAL: Alert, NAD  CHEST/LUNG: Respirations non labored, good inspiratory effort  HEART: S1S2  HEENT: NCAT, EOMI, conjunctiva clear   ABDOMEN: Nondistended, + bowel sounds, nontender  : Primafit in place  EXTREMITIES:  No calf tenderness, no edema    I&O's Detail    29 Dec 2023 07:01  -  30 Dec 2023 07:00  --------------------------------------------------------  IN:  Total IN: 0 mL    OUT:    Voided (mL): 1000 mL  Total OUT: 1000 mL    Total NET: -1000 mL          LABS:                        9.9    7.49  )-----------( 185      ( 29 Dec 2023 06:25 )             29.8     12-30    137  |  103  |  7   ----------------------------<  134<H>  3.9   |  26  |  0.50    Ca    8.7      30 Dec 2023 06:00    TPro  6.5  /  Alb  2.3<L>  /  TBili  0.5  /  DBili  x   /  AST  18  /  ALT  30  /  AlkPhos  185<H>  12-30        RADIOLOGY & ADDITIONAL STUDIES:    < from: MR MRCP w/wo IV Cont (12.26.23 @ 16:34) >    ACC: [id number] EXAM:  MR MRCP WAW IC   ORDERED BY: ERICA IBARRA     PROCEDURE DATE:  12/26/2023          INTERPRETATION:  CLINICAL INFORMATION: Severe acute pancreatitis    COMPARISON: Same date gallbladder ultrasound and CT abdomen and pelvis   one day prior    CONTRAST/COMPLICATIONS:  IV Contrast: Gadavist  5 cc administered   5 cc discarded  Oral Contrast: NONE  Complications: None reported at time of study completion    PROCEDURE:  MRI of the abdomen was performed.  MRCP was performed.    FINDINGS:  LOWER CHEST: Clear.    LIVER: Normal.  BILE DUCTS: 8 mm common bile duct with normal distal tapering. No filling   defect. No intrahepatic dilatation.  GALLBLADDER: A few small stones without wall thickening or signs of   inflammation.  SPLEEN: Normal.  PANCREAS: Pancreatic and peripancreatic edema with mild acute   peripancreatic fluid collections. No necrosis. No main duct dilatation or   mass lesion.  ADRENALS: Normal.  KIDNEYS/URETERS: Symmetric nephrograms. No hydronephrosis.    VISUALIZED PORTIONS:  BOWEL: No dilated bowel.  PERITONEUM: Trace ascites.  VESSELS: Aortic atherosclerosis without aneurysm.  RETROPERITONEUM/LYMPH NODES: No adenopathy.  ABDOMINAL WALL: Normal.  BONES: No aggressive lesion.    IMPRESSION:  *  Interstitial edematous pancreatitis with mild acute peripancreatic   fluid collections.  *  Small gallstones without biliary dilatation or choledocholithiasis.      --- End of Report ---            LUIS ALBERTO ARIAS MD; Attending Radiologist  This document has beenelectronically signed. Dec 26 2023  5:33PM    < end of copied text >         Patient seen and examined at bedside with Dr. Lieberman offering no complaints. Patient eating clear liquids and speaking with son, Du at bedside.   Tolerating clear liquid diet.  Admits to flatus/BM.   Denies pain, nausea/ vomiting, chills, SOB, chest pain, calf tenderness.          Vital Signs Last 24 Hrs  T(F): 98.8 (12-30-23 @ 12:29), Max: 99.7 (12-30-23 @ 05:27)  HR: 84 (12-30-23 @ 12:29)  BP: 152/70 (12-30-23 @ 12:29)  RR: 18 (12-30-23 @ 12:29)  SpO2: 98% (12-30-23 @ 12:29)  Wt(kg): --   CAPILLARY BLOOD GLUCOSE          GENERAL: Alert, NAD  CHEST/LUNG: Respirations non labored, good inspiratory effort  HEART: S1S2  HEENT: NCAT, EOMI, conjunctiva clear   ABDOMEN: Nondistended, + bowel sounds, nontender  : Primafit in place  EXTREMITIES:  No calf tenderness, no edema    I&O's Detail    29 Dec 2023 07:01  -  30 Dec 2023 07:00  --------------------------------------------------------  IN:  Total IN: 0 mL    OUT:    Voided (mL): 1000 mL  Total OUT: 1000 mL    Total NET: -1000 mL          LABS:                        9.9    7.49  )-----------( 185      ( 29 Dec 2023 06:25 )             29.8     12-30    137  |  103  |  7   ----------------------------<  134<H>  3.9   |  26  |  0.50    Ca    8.7      30 Dec 2023 06:00    TPro  6.5  /  Alb  2.3<L>  /  TBili  0.5  /  DBili  x   /  AST  18  /  ALT  30  /  AlkPhos  185<H>  12-30        RADIOLOGY & ADDITIONAL STUDIES:    < from: MR MRCP w/wo IV Cont (12.26.23 @ 16:34) >    ACC: [id number] EXAM:  MR MRCP WAW IC   ORDERED BY: ERICA IBARRA     PROCEDURE DATE:  12/26/2023          INTERPRETATION:  CLINICAL INFORMATION: Severe acute pancreatitis    COMPARISON: Same date gallbladder ultrasound and CT abdomen and pelvis   one day prior    CONTRAST/COMPLICATIONS:  IV Contrast: Gadavist  5 cc administered   5 cc discarded  Oral Contrast: NONE  Complications: None reported at time of study completion    PROCEDURE:  MRI of the abdomen was performed.  MRCP was performed.    FINDINGS:  LOWER CHEST: Clear.    LIVER: Normal.  BILE DUCTS: 8 mm common bile duct with normal distal tapering. No filling   defect. No intrahepatic dilatation.  GALLBLADDER: A few small stones without wall thickening or signs of   inflammation.  SPLEEN: Normal.  PANCREAS: Pancreatic and peripancreatic edema with mild acute   peripancreatic fluid collections. No necrosis. No main duct dilatation or   mass lesion.  ADRENALS: Normal.  KIDNEYS/URETERS: Symmetric nephrograms. No hydronephrosis.    VISUALIZED PORTIONS:  BOWEL: No dilated bowel.  PERITONEUM: Trace ascites.  VESSELS: Aortic atherosclerosis without aneurysm.  RETROPERITONEUM/LYMPH NODES: No adenopathy.  ABDOMINAL WALL: Normal.  BONES: No aggressive lesion.    IMPRESSION:  *  Interstitial edematous pancreatitis with mild acute peripancreatic   fluid collections.  *  Small gallstones without biliary dilatation or choledocholithiasis.      --- End of Report ---            LUIS ALBERTO ARIAS MD; Attending Radiologist  This document has beenelectronically signed. Dec 26 2023  5:33PM    < end of copied text >

## 2023-12-30 NOTE — PROGRESS NOTE ADULT - ASSESSMENT
A/P    1. Acute gallstone pancreatitis:  - Pain improved  - IV fluids  - PO clears  - Surgery following  - OR once fevers resolve    2. HTN:  - Well controlled    3. UTI:  - IV ceftriaxone (day 3/3), monitor fever curve    Updated Ms. Sam's son-in-law, Du, via phone at bedside (per her request). All questions answered. A/P    1. Acute gallstone pancreatitis:  - Pain improved  - IV fluids  - will advance diet to soft, bite size and monitor  - Surgery following  - OR once fevers resolve    2. HTN:  - Well controlled    3. UTI:  - IV ceftriaxone (day 3/3), monitor fever curve    Updated Ms. Sam's son-in-law, Du, via phone at bedside (per her request). All questions answered. A/P    1. Acute gallstone pancreatitis:  - Pain improved  - d/c fluids, encourage PO intake  - will advance diet to soft, bite size and monitor  - Surgery following  - OR once fevers resolve    2. HTN:  - Well controlled    3. UTI:  - IV ceftriaxone (day 3/3), monitor fever curve    Updated Ms. Sam's son-in-law, Du, via phone at bedside (per her request). All questions answered.

## 2023-12-30 NOTE — PROGRESS NOTE ADULT - ASSESSMENT
79 Y/O female with gallstone pancreatitis now improving. Patient afebrile at present. LFTs WNL.         PLAN:     - Plan for repeat CT scan tomorrow   - F/U AM labs  - Multimodal pain control  - OOB as tolerated, walking as tolerated; DVT PPx  - Continue care per primary team   - Discussed with Dr. Lieberman  77 Y/O female with gallstone pancreatitis now improving. Patient afebrile at present. LFTs WNL.         PLAN:     - Plan for repeat CT scan tomorrow   - F/U AM labs  - Multimodal pain control  - OOB as tolerated, walking as tolerated; DVT PPx  - Continue care per primary team   - Discussed with Dr. Lieberman  79 Y/O female with gallstone pancreatitis now improving. Patient afebrile at present. LFTs WNL. Abdominal exam improved.         PLAN:     - Plan for repeat CT scan tomorrow   - F/U AM labs  - Multimodal pain control  - OOB as tolerated, walking as tolerated; DVT PPx  - Continue care per primary team   - Discussed with Dr. Lieberman  79 Y/O female with gallstone pancreatitis now improving. Patient afebrile at present. LFTs WNL. Abdominal exam improved. Discussed plans with Son, Du, at bedside. Diet was advanced to soft.         PLAN:     - OR planning; Plan for repeat CT scan tomorrow for reassessment    - Recommend continued IVF in the pre-operative period   - F/U AM labs  - I&Os  - Medical optimization for planned procedure   - OOB as tolerated, walking as tolerated; DVT PPx  - Continue care per primary team   - Discussed with Dr. Lieberman  77 Y/O female with gallstone pancreatitis now improving. Patient afebrile at present. LFTs WNL. Abdominal exam improved. Discussed plans with Son, Du, at bedside. Diet was advanced to soft.         PLAN:     - OR planning  - Recommend continued IVF in the pre-operative period   - F/U AM labs  - I&Os  - Medical optimization for planned procedure   - OOB as tolerated, walking as tolerated; DVT PPx  - Continue care per primary team   - Discussed with Dr. Lieberman  77 Y/O female with gallstone pancreatitis now improving. Patient afebrile at present. LFTs WNL. Abdominal exam improved. Discussed plans with Son, Du, at bedside. Diet was advanced to soft.         PLAN:     - OR planning  - Recommend continued IVF in the pre-operative period   - F/U AM labs  - I&Os  - Medical optimization for planned procedure   - OOB as tolerated, walking as tolerated; DVT PPx  - Continue care per primary team   - Discussed with Dr. Lieberamn

## 2023-12-30 NOTE — PHYSICAL THERAPY INITIAL EVALUATION ADULT - NSPTDISCHREC_GEN_A_CORE
Home with home PT for home safety evaluation and to improve functional mobility and to prevent injury from fall

## 2023-12-30 NOTE — PHYSICAL THERAPY INITIAL EVALUATION ADULT - ADDITIONAL COMMENTS
As per pt lives with daughter in a private house with 6 steps landing and another 6 more steps to enter with bilateral hand rails (narrow). Reports to be previously independent in all mobility without any assistive devices. Denies any falls less than 6 months ago.

## 2023-12-30 NOTE — PROGRESS NOTE ADULT - SUBJECTIVE AND OBJECTIVE BOX
Patient is a 78y old  Female who presents with a chief complaint of     SUBJECTIVE / OVERNIGHT EVENTS:    No events overnight. This AM, AMRIT ENAMORADO feels well. Has no n/v/d/cp/sob.      MEDICATIONS  (STANDING):  heparin   Injectable 5000 Unit(s) SubCutaneous every 8 hours  influenza  Vaccine (HIGH DOSE) 0.7 milliLiter(s) IntraMuscular once  lactated ringers. 1000 milliLiter(s) (100 mL/Hr) IV Continuous <Continuous>    MEDICATIONS  (PRN):  acetaminophen     Tablet .. 650 milliGRAM(s) Oral every 6 hours PRN Temp greater or equal to 38C (100.4F), Mild Pain (1 - 3)  albuterol    90 MICROgram(s) HFA Inhaler 2 Puff(s) Inhalation every 6 hours PRN Shortness of Breath and/or Wheezing  aluminum hydroxide/magnesium hydroxide/simethicone Suspension 30 milliLiter(s) Oral every 4 hours PRN Dyspepsia  guaiFENesin Oral Liquid (Sugar-Free) 100 milliGRAM(s) Oral every 6 hours PRN Cough  melatonin 3 milliGRAM(s) Oral at bedtime PRN Insomnia  ondansetron Injectable 4 milliGRAM(s) IV Push every 8 hours PRN Nausea and/or Vomiting      PHYSICAL EXAM:  T(C): 37.6 (12-30-23 @ 05:27), Max: 37.6 (12-30-23 @ 05:27)  HR: 89 (12-30-23 @ 05:27) (85 - 89)  BP: 147/71 (12-30-23 @ 05:27) (147/71 - 154/75)  RR: 18 (12-30-23 @ 05:27) (18 - 18)  SpO2: 94% (12-30-23 @ 05:27) (94% - 98%)  I&O's Summary    29 Dec 2023 07:01  -  30 Dec 2023 07:00  --------------------------------------------------------  IN: 0 mL / OUT: 1000 mL / NET: -1000 mL      GENERAL: NAD, lying in bed  HEAD:  Atraumatic, Normocephalic, MMM  CHEST/LUNG: No use of accessory muscles, CTAB, breathing non-labored  COR: RR, no mrcg  ABD: Soft, ND/NT, +BS  PSYCH: AAOx3  NEUROLOGY: CN II-XII grossly intact, moving all extremities  SKIN: No rashes or lesions  EXT: wwp, no cce    LABS:  CAPILLARY BLOOD GLUCOSE                              9.9    7.49  )-----------( 185      ( 29 Dec 2023 06:25 )             29.8     12-30    137  |  103  |  7   ----------------------------<  134<H>  3.9   |  26  |  0.50    Ca    8.7      30 Dec 2023 06:00    TPro  6.5  /  Alb  2.3<L>  /  TBili  0.5  /  DBili  x   /  AST  18  /  ALT  30  /  AlkPhos  185<H>  12-30          Urinalysis Basic - ( 30 Dec 2023 06:00 )    Color: x / Appearance: x / SG: x / pH: x  Gluc: 134 mg/dL / Ketone: x  / Bili: x / Urobili: x   Blood: x / Protein: x / Nitrite: x   Leuk Esterase: x / RBC: x / WBC x   Sq Epi: x / Non Sq Epi: x / Bacteria: x          RADIOLOGY & ADDITIONAL TESTS:    Telemetry Personally Reviewed -     Imaging Personally Reviewed -     Imaging Reviewed -     Consultant(s) Notes Reviewed -       Care Discussed with Consultants/Other Providers -

## 2023-12-30 NOTE — PHYSICAL THERAPY INITIAL EVALUATION ADULT - NS ASR RISK AREAS PT EVAL
Moved pt to room 10 for his comfort, continues to have loose bowel movements. Case is delayed, for pt comfort, will start IV and give ordered medications closer to case start time.   
Pt complained of itching at IV site, around tape there is a red welt. Replaced tape with paper tape and slowed ABX. Will continue to monitor and observe site.  
fall

## 2023-12-30 NOTE — PHYSICAL THERAPY INITIAL EVALUATION ADULT - PERTINENT HX OF CURRENT PROBLEM, REHAB EVAL
As per ED notes · A 78 year old female, PMH of HTN, DM C/o generalized abdominal pain with nausea and vomiting for last 3 hrs. Also endorsed some dizziness, Reports eating [place] delicacy and started having symptoms afterwards.  Noted in distress.· Chief Complaint	abdominal pain, nausea, vomiting

## 2023-12-31 LAB
ALBUMIN SERPL ELPH-MCNC: 2.3 G/DL — LOW (ref 3.3–5)
ALBUMIN SERPL ELPH-MCNC: 2.3 G/DL — LOW (ref 3.3–5)
ALP SERPL-CCNC: 223 U/L — HIGH (ref 40–120)
ALP SERPL-CCNC: 223 U/L — HIGH (ref 40–120)
ALT FLD-CCNC: 44 U/L — SIGNIFICANT CHANGE UP (ref 12–78)
ALT FLD-CCNC: 44 U/L — SIGNIFICANT CHANGE UP (ref 12–78)
ANION GAP SERPL CALC-SCNC: 5 MMOL/L — SIGNIFICANT CHANGE UP (ref 5–17)
ANION GAP SERPL CALC-SCNC: 5 MMOL/L — SIGNIFICANT CHANGE UP (ref 5–17)
AST SERPL-CCNC: 30 U/L — SIGNIFICANT CHANGE UP (ref 15–37)
AST SERPL-CCNC: 30 U/L — SIGNIFICANT CHANGE UP (ref 15–37)
BILIRUB DIRECT SERPL-MCNC: 0.2 MG/DL — SIGNIFICANT CHANGE UP (ref 0–0.3)
BILIRUB DIRECT SERPL-MCNC: 0.2 MG/DL — SIGNIFICANT CHANGE UP (ref 0–0.3)
BILIRUB INDIRECT FLD-MCNC: 0.1 MG/DL — LOW (ref 0.2–1)
BILIRUB INDIRECT FLD-MCNC: 0.1 MG/DL — LOW (ref 0.2–1)
BILIRUB SERPL-MCNC: 0.3 MG/DL — SIGNIFICANT CHANGE UP (ref 0.2–1.2)
BILIRUB SERPL-MCNC: 0.3 MG/DL — SIGNIFICANT CHANGE UP (ref 0.2–1.2)
BUN SERPL-MCNC: 10 MG/DL — SIGNIFICANT CHANGE UP (ref 7–23)
BUN SERPL-MCNC: 10 MG/DL — SIGNIFICANT CHANGE UP (ref 7–23)
CALCIUM SERPL-MCNC: 8.8 MG/DL — SIGNIFICANT CHANGE UP (ref 8.5–10.1)
CALCIUM SERPL-MCNC: 8.8 MG/DL — SIGNIFICANT CHANGE UP (ref 8.5–10.1)
CHLORIDE SERPL-SCNC: 104 MMOL/L — SIGNIFICANT CHANGE UP (ref 96–108)
CHLORIDE SERPL-SCNC: 104 MMOL/L — SIGNIFICANT CHANGE UP (ref 96–108)
CO2 SERPL-SCNC: 26 MMOL/L — SIGNIFICANT CHANGE UP (ref 22–31)
CO2 SERPL-SCNC: 26 MMOL/L — SIGNIFICANT CHANGE UP (ref 22–31)
CREAT SERPL-MCNC: 0.54 MG/DL — SIGNIFICANT CHANGE UP (ref 0.5–1.3)
CREAT SERPL-MCNC: 0.54 MG/DL — SIGNIFICANT CHANGE UP (ref 0.5–1.3)
EGFR: 95 ML/MIN/1.73M2 — SIGNIFICANT CHANGE UP
EGFR: 95 ML/MIN/1.73M2 — SIGNIFICANT CHANGE UP
GLUCOSE SERPL-MCNC: 163 MG/DL — HIGH (ref 70–99)
GLUCOSE SERPL-MCNC: 163 MG/DL — HIGH (ref 70–99)
HCT VFR BLD CALC: 31.9 % — LOW (ref 34.5–45)
HCT VFR BLD CALC: 31.9 % — LOW (ref 34.5–45)
HGB BLD-MCNC: 10.7 G/DL — LOW (ref 11.5–15.5)
HGB BLD-MCNC: 10.7 G/DL — LOW (ref 11.5–15.5)
MCHC RBC-ENTMCNC: 29.8 PG — SIGNIFICANT CHANGE UP (ref 27–34)
MCHC RBC-ENTMCNC: 29.8 PG — SIGNIFICANT CHANGE UP (ref 27–34)
MCHC RBC-ENTMCNC: 33.5 G/DL — SIGNIFICANT CHANGE UP (ref 32–36)
MCHC RBC-ENTMCNC: 33.5 G/DL — SIGNIFICANT CHANGE UP (ref 32–36)
MCV RBC AUTO: 88.9 FL — SIGNIFICANT CHANGE UP (ref 80–100)
MCV RBC AUTO: 88.9 FL — SIGNIFICANT CHANGE UP (ref 80–100)
NRBC # BLD: 0 /100 WBCS — SIGNIFICANT CHANGE UP (ref 0–0)
NRBC # BLD: 0 /100 WBCS — SIGNIFICANT CHANGE UP (ref 0–0)
PLATELET # BLD AUTO: 261 K/UL — SIGNIFICANT CHANGE UP (ref 150–400)
PLATELET # BLD AUTO: 261 K/UL — SIGNIFICANT CHANGE UP (ref 150–400)
POTASSIUM SERPL-MCNC: 4 MMOL/L — SIGNIFICANT CHANGE UP (ref 3.5–5.3)
POTASSIUM SERPL-MCNC: 4 MMOL/L — SIGNIFICANT CHANGE UP (ref 3.5–5.3)
POTASSIUM SERPL-SCNC: 4 MMOL/L — SIGNIFICANT CHANGE UP (ref 3.5–5.3)
POTASSIUM SERPL-SCNC: 4 MMOL/L — SIGNIFICANT CHANGE UP (ref 3.5–5.3)
PROT SERPL-MCNC: 6.7 GM/DL — SIGNIFICANT CHANGE UP (ref 6–8.3)
PROT SERPL-MCNC: 6.7 GM/DL — SIGNIFICANT CHANGE UP (ref 6–8.3)
RBC # BLD: 3.59 M/UL — LOW (ref 3.8–5.2)
RBC # BLD: 3.59 M/UL — LOW (ref 3.8–5.2)
RBC # FLD: 12.8 % — SIGNIFICANT CHANGE UP (ref 10.3–14.5)
RBC # FLD: 12.8 % — SIGNIFICANT CHANGE UP (ref 10.3–14.5)
SODIUM SERPL-SCNC: 135 MMOL/L — SIGNIFICANT CHANGE UP (ref 135–145)
SODIUM SERPL-SCNC: 135 MMOL/L — SIGNIFICANT CHANGE UP (ref 135–145)
WBC # BLD: 8.32 K/UL — SIGNIFICANT CHANGE UP (ref 3.8–10.5)
WBC # BLD: 8.32 K/UL — SIGNIFICANT CHANGE UP (ref 3.8–10.5)
WBC # FLD AUTO: 8.32 K/UL — SIGNIFICANT CHANGE UP (ref 3.8–10.5)
WBC # FLD AUTO: 8.32 K/UL — SIGNIFICANT CHANGE UP (ref 3.8–10.5)

## 2023-12-31 PROCEDURE — 99233 SBSQ HOSP IP/OBS HIGH 50: CPT | Mod: FS

## 2023-12-31 PROCEDURE — 99232 SBSQ HOSP IP/OBS MODERATE 35: CPT

## 2023-12-31 RX ADMIN — HEPARIN SODIUM 5000 UNIT(S): 5000 INJECTION INTRAVENOUS; SUBCUTANEOUS at 05:29

## 2023-12-31 RX ADMIN — HEPARIN SODIUM 5000 UNIT(S): 5000 INJECTION INTRAVENOUS; SUBCUTANEOUS at 14:56

## 2023-12-31 RX ADMIN — ALBUTEROL 2 PUFF(S): 90 AEROSOL, METERED ORAL at 21:53

## 2023-12-31 RX ADMIN — HEPARIN SODIUM 5000 UNIT(S): 5000 INJECTION INTRAVENOUS; SUBCUTANEOUS at 21:54

## 2023-12-31 NOTE — PROGRESS NOTE ADULT - ASSESSMENT
77 Y/O female with gallstone pancreatitis now improving. Patient afebrile at present. LFTs WNL. Abdominal exam improved.     - Possible CT today pending labs  - Medical optimization for Laparoscopic Cholecystectomy   - OOB as tolerated, walking as tolerated; DVT PPx, continue IVF    - Continue care per primary team   - Will D/W surgical attending

## 2023-12-31 NOTE — PROGRESS NOTE ADULT - SUBJECTIVE AND OBJECTIVE BOX
Patient is a 78y old  Female who presents with a chief complaint of     SUBJECTIVE / OVERNIGHT EVENTS:    No events overnight. This AM, AMRIT ENAMORADO feels well. Has no n/v/d/cp/sob. Tolerating diet so far.    MEDICATIONS  (STANDING):  heparin   Injectable 5000 Unit(s) SubCutaneous every 8 hours  influenza  Vaccine (HIGH DOSE) 0.7 milliLiter(s) IntraMuscular once    MEDICATIONS  (PRN):  acetaminophen     Tablet .. 650 milliGRAM(s) Oral every 6 hours PRN Temp greater or equal to 38C (100.4F), Mild Pain (1 - 3)  albuterol    90 MICROgram(s) HFA Inhaler 2 Puff(s) Inhalation every 6 hours PRN Shortness of Breath and/or Wheezing  aluminum hydroxide/magnesium hydroxide/simethicone Suspension 30 milliLiter(s) Oral every 4 hours PRN Dyspepsia  guaiFENesin Oral Liquid (Sugar-Free) 100 milliGRAM(s) Oral every 6 hours PRN Cough  melatonin 3 milliGRAM(s) Oral at bedtime PRN Insomnia  ondansetron Injectable 4 milliGRAM(s) IV Push every 8 hours PRN Nausea and/or Vomiting      PHYSICAL EXAM:  T(C): 37 (12-31-23 @ 05:18), Max: 37.1 (12-30-23 @ 12:29)  HR: 83 (12-31-23 @ 05:18) (78 - 88)  BP: 139/56 (12-31-23 @ 05:18) (138/69 - 152/90)  RR: 18 (12-31-23 @ 05:18) (18 - 18)  SpO2: 95% (12-31-23 @ 05:18) (95% - 98%)  I&O's Summary    30 Dec 2023 07:01  -  31 Dec 2023 07:00  --------------------------------------------------------  IN: 0 mL / OUT: 1000 mL / NET: -1000 mL      GENERAL: NAD, lying in bed  HEAD:  Atraumatic, Normocephalic, MMM  CHEST/LUNG: No use of accessory muscles, CTAB, breathing non-labored  COR: RR, no mrcg  ABD: Soft, ND/NT, +BS  PSYCH: AAOx3  NEUROLOGY: CN II-XII grossly intact, moving all extremities  SKIN: No rashes or lesions  EXT: wwp, no cce    LABS:  CAPILLARY BLOOD GLUCOSE                              10.7   8.32  )-----------( 261      ( 31 Dec 2023 07:15 )             31.9     12-31    135  |  104  |  10  ----------------------------<  163<H>  4.0   |  26  |  0.54    Ca    8.8      31 Dec 2023 07:15    TPro  6.7  /  Alb  2.3<L>  /  TBili  0.3  /  DBili  0.2  /  AST  30  /  ALT  44  /  AlkPhos  223<H>  12-31          Urinalysis Basic - ( 31 Dec 2023 07:15 )    Color: x / Appearance: x / SG: x / pH: x  Gluc: 163 mg/dL / Ketone: x  / Bili: x / Urobili: x   Blood: x / Protein: x / Nitrite: x   Leuk Esterase: x / RBC: x / WBC x   Sq Epi: x / Non Sq Epi: x / Bacteria: x          RADIOLOGY & ADDITIONAL TESTS:    Telemetry Personally Reviewed -     Imaging Personally Reviewed -     Imaging Reviewed -     Consultant(s) Notes Reviewed -       Care Discussed with Consultants/Other Providers -

## 2023-12-31 NOTE — PROGRESS NOTE ADULT - ASSESSMENT
A/P    1. Acute gallstone pancreatitis:  - Pain improved  - off of fluids, encourage PO intake  - advance diet to regular  - Surgery following - may need repeat CT a/p pending today's labs, will f/u recs  - fevers resolved  - Meeks score <1%. Patient does 30 minutes of aerobic exercise (brisk walking) daily and has no SOB/cp. Has unlimited exercise tolerance. METS >4. Not at increased risk of MACE related to hepatobiliary surgery.   -No further testing required prior to OR. Patient is optimized.    2. HTN:  - Well controlled    3. UTI:  - s/p IV ceftriaxone (day 3/3)

## 2023-12-31 NOTE — PROGRESS NOTE ADULT - SUBJECTIVE AND OBJECTIVE BOX
Surgery NP note  Patient seen and examined bedside resting comfortably.  No complaints offered. NO Abdominal pain   Denies nausea and vomiting. Tolerating diet.  Normal flatus/BM.     T(F): 98.6 (12-31-23 @ 05:18), Max: 98.8 (12-30-23 @ 12:29)  HR: 83 (12-31-23 @ 05:18) (78 - 88)  BP: 139/56 (12-31-23 @ 05:18) (138/69 - 152/90)  RR: 18 (12-31-23 @ 05:18) (18 - 18)  SpO2: 95% (12-31-23 @ 05:18) (95% - 98%)  Wt(kg): --  CAPILLARY BLOOD GLUCOSE          PHYSICAL EXAM:  General: NAD, WDWN.   Neuro:  Alert & responsive  HEENT: NCAT, EOMI, conjunctiva clear  CV: +S1+S2 regular rate and rhythm  Lung: clear to ausculation bilaterally, respirations nonlabored, good inspiratory effort  Abdomen: soft, Non tender, No Distension. Normal active BS  Extremities: no pedal edema or calf tenderness noted     LABS:  PENDING    I&O's Detail    29 Dec 2023 07:01  -  30 Dec 2023 07:00  --------------------------------------------------------  IN:  Total IN: 0 mL    OUT:    Voided (mL): 1000 mL  Total OUT: 1000 mL    Total NET: -1000 mL      30 Dec 2023 07:01  -  31 Dec 2023 06:33  --------------------------------------------------------  IN:  Total IN: 0 mL    OUT:    Voided (mL): 1000 mL  Total OUT: 1000 mL    Total NET: -1000 mL

## 2024-01-01 DIAGNOSIS — R11.2 NAUSEA WITH VOMITING, UNSPECIFIED: ICD-10-CM

## 2024-01-01 DIAGNOSIS — K85.10 BILIARY ACUTE PANCREATITIS WITHOUT NECROSIS OR INFECTION: ICD-10-CM

## 2024-01-01 DIAGNOSIS — R10.2 PELVIC AND PERINEAL PAIN: ICD-10-CM

## 2024-01-01 LAB
ALBUMIN SERPL ELPH-MCNC: 2.4 G/DL — LOW (ref 3.3–5)
ALBUMIN SERPL ELPH-MCNC: 2.4 G/DL — LOW (ref 3.3–5)
ALP SERPL-CCNC: 237 U/L — HIGH (ref 40–120)
ALP SERPL-CCNC: 237 U/L — HIGH (ref 40–120)
ALT FLD-CCNC: 40 U/L — SIGNIFICANT CHANGE UP (ref 12–78)
ALT FLD-CCNC: 40 U/L — SIGNIFICANT CHANGE UP (ref 12–78)
ANION GAP SERPL CALC-SCNC: 7 MMOL/L — SIGNIFICANT CHANGE UP (ref 5–17)
ANION GAP SERPL CALC-SCNC: 7 MMOL/L — SIGNIFICANT CHANGE UP (ref 5–17)
AST SERPL-CCNC: 26 U/L — SIGNIFICANT CHANGE UP (ref 15–37)
AST SERPL-CCNC: 26 U/L — SIGNIFICANT CHANGE UP (ref 15–37)
BILIRUB SERPL-MCNC: 0.4 MG/DL — SIGNIFICANT CHANGE UP (ref 0.2–1.2)
BILIRUB SERPL-MCNC: 0.4 MG/DL — SIGNIFICANT CHANGE UP (ref 0.2–1.2)
BUN SERPL-MCNC: 10 MG/DL — SIGNIFICANT CHANGE UP (ref 7–23)
BUN SERPL-MCNC: 10 MG/DL — SIGNIFICANT CHANGE UP (ref 7–23)
CALCIUM SERPL-MCNC: 9.5 MG/DL — SIGNIFICANT CHANGE UP (ref 8.5–10.1)
CALCIUM SERPL-MCNC: 9.5 MG/DL — SIGNIFICANT CHANGE UP (ref 8.5–10.1)
CHLORIDE SERPL-SCNC: 102 MMOL/L — SIGNIFICANT CHANGE UP (ref 96–108)
CHLORIDE SERPL-SCNC: 102 MMOL/L — SIGNIFICANT CHANGE UP (ref 96–108)
CO2 SERPL-SCNC: 26 MMOL/L — SIGNIFICANT CHANGE UP (ref 22–31)
CO2 SERPL-SCNC: 26 MMOL/L — SIGNIFICANT CHANGE UP (ref 22–31)
CREAT SERPL-MCNC: 0.52 MG/DL — SIGNIFICANT CHANGE UP (ref 0.5–1.3)
CREAT SERPL-MCNC: 0.52 MG/DL — SIGNIFICANT CHANGE UP (ref 0.5–1.3)
CULTURE RESULTS: SIGNIFICANT CHANGE UP
CULTURE RESULTS: SIGNIFICANT CHANGE UP
EGFR: 96 ML/MIN/1.73M2 — SIGNIFICANT CHANGE UP
EGFR: 96 ML/MIN/1.73M2 — SIGNIFICANT CHANGE UP
GLUCOSE SERPL-MCNC: 148 MG/DL — HIGH (ref 70–99)
GLUCOSE SERPL-MCNC: 148 MG/DL — HIGH (ref 70–99)
HCT VFR BLD CALC: 33.7 % — LOW (ref 34.5–45)
HCT VFR BLD CALC: 33.7 % — LOW (ref 34.5–45)
HGB BLD-MCNC: 10.8 G/DL — LOW (ref 11.5–15.5)
HGB BLD-MCNC: 10.8 G/DL — LOW (ref 11.5–15.5)
MCHC RBC-ENTMCNC: 28.8 PG — SIGNIFICANT CHANGE UP (ref 27–34)
MCHC RBC-ENTMCNC: 28.8 PG — SIGNIFICANT CHANGE UP (ref 27–34)
MCHC RBC-ENTMCNC: 32 G/DL — SIGNIFICANT CHANGE UP (ref 32–36)
MCHC RBC-ENTMCNC: 32 G/DL — SIGNIFICANT CHANGE UP (ref 32–36)
MCV RBC AUTO: 89.9 FL — SIGNIFICANT CHANGE UP (ref 80–100)
MCV RBC AUTO: 89.9 FL — SIGNIFICANT CHANGE UP (ref 80–100)
NRBC # BLD: 0 /100 WBCS — SIGNIFICANT CHANGE UP (ref 0–0)
NRBC # BLD: 0 /100 WBCS — SIGNIFICANT CHANGE UP (ref 0–0)
PLATELET # BLD AUTO: 315 K/UL — SIGNIFICANT CHANGE UP (ref 150–400)
PLATELET # BLD AUTO: 315 K/UL — SIGNIFICANT CHANGE UP (ref 150–400)
POTASSIUM SERPL-MCNC: 4.4 MMOL/L — SIGNIFICANT CHANGE UP (ref 3.5–5.3)
POTASSIUM SERPL-MCNC: 4.4 MMOL/L — SIGNIFICANT CHANGE UP (ref 3.5–5.3)
POTASSIUM SERPL-SCNC: 4.4 MMOL/L — SIGNIFICANT CHANGE UP (ref 3.5–5.3)
POTASSIUM SERPL-SCNC: 4.4 MMOL/L — SIGNIFICANT CHANGE UP (ref 3.5–5.3)
PROT SERPL-MCNC: 7.2 GM/DL — SIGNIFICANT CHANGE UP (ref 6–8.3)
PROT SERPL-MCNC: 7.2 GM/DL — SIGNIFICANT CHANGE UP (ref 6–8.3)
RBC # BLD: 3.75 M/UL — LOW (ref 3.8–5.2)
RBC # BLD: 3.75 M/UL — LOW (ref 3.8–5.2)
RBC # FLD: 13 % — SIGNIFICANT CHANGE UP (ref 10.3–14.5)
RBC # FLD: 13 % — SIGNIFICANT CHANGE UP (ref 10.3–14.5)
SODIUM SERPL-SCNC: 135 MMOL/L — SIGNIFICANT CHANGE UP (ref 135–145)
SODIUM SERPL-SCNC: 135 MMOL/L — SIGNIFICANT CHANGE UP (ref 135–145)
SPECIMEN SOURCE: SIGNIFICANT CHANGE UP
SPECIMEN SOURCE: SIGNIFICANT CHANGE UP
WBC # BLD: 8.56 K/UL — SIGNIFICANT CHANGE UP (ref 3.8–10.5)
WBC # BLD: 8.56 K/UL — SIGNIFICANT CHANGE UP (ref 3.8–10.5)
WBC # FLD AUTO: 8.56 K/UL — SIGNIFICANT CHANGE UP (ref 3.8–10.5)
WBC # FLD AUTO: 8.56 K/UL — SIGNIFICANT CHANGE UP (ref 3.8–10.5)

## 2024-01-01 PROCEDURE — 99233 SBSQ HOSP IP/OBS HIGH 50: CPT | Mod: FS

## 2024-01-01 PROCEDURE — 99232 SBSQ HOSP IP/OBS MODERATE 35: CPT

## 2024-01-01 RX ADMIN — HEPARIN SODIUM 5000 UNIT(S): 5000 INJECTION INTRAVENOUS; SUBCUTANEOUS at 22:14

## 2024-01-01 RX ADMIN — HEPARIN SODIUM 5000 UNIT(S): 5000 INJECTION INTRAVENOUS; SUBCUTANEOUS at 05:27

## 2024-01-01 RX ADMIN — Medication 100 MILLIGRAM(S): at 22:30

## 2024-01-01 RX ADMIN — HEPARIN SODIUM 5000 UNIT(S): 5000 INJECTION INTRAVENOUS; SUBCUTANEOUS at 15:03

## 2024-01-01 NOTE — PROGRESS NOTE ADULT - SUBJECTIVE AND OBJECTIVE BOX
Patient is a 78y old  Female who presents with a chief complaint of     SUBJECTIVE / OVERNIGHT EVENTS:    No events overnight. This AM, AMRIT ENAMORADO feels well. Has no n/v/d/cp/sob. Tolerating regular meals.    MEDICATIONS  (STANDING):  heparin   Injectable 5000 Unit(s) SubCutaneous every 8 hours  influenza  Vaccine (HIGH DOSE) 0.7 milliLiter(s) IntraMuscular once    MEDICATIONS  (PRN):  acetaminophen     Tablet .. 650 milliGRAM(s) Oral every 6 hours PRN Temp greater or equal to 38C (100.4F), Mild Pain (1 - 3)  albuterol    90 MICROgram(s) HFA Inhaler 2 Puff(s) Inhalation every 6 hours PRN Shortness of Breath and/or Wheezing  aluminum hydroxide/magnesium hydroxide/simethicone Suspension 30 milliLiter(s) Oral every 4 hours PRN Dyspepsia  guaiFENesin Oral Liquid (Sugar-Free) 100 milliGRAM(s) Oral every 6 hours PRN Cough  melatonin 3 milliGRAM(s) Oral at bedtime PRN Insomnia  ondansetron Injectable 4 milliGRAM(s) IV Push every 8 hours PRN Nausea and/or Vomiting      PHYSICAL EXAM:  T(C): 37.2 (01-01-24 @ 04:44), Max: 37.2 (01-01-24 @ 04:44)  HR: 79 (01-01-24 @ 04:44) (79 - 89)  BP: 130/59 (01-01-24 @ 04:44) (120/70 - 148/69)  RR: 18 (01-01-24 @ 04:44) (18 - 18)  SpO2: 98% (01-01-24 @ 04:44) (95% - 98%)  I&O's Summary    GENERAL: NAD, lying in bed  HEAD:  Atraumatic, Normocephalic, MMM  CHEST/LUNG: No use of accessory muscles, CTAB, breathing non-labored  COR: RR, no mrcg  ABD: Soft, ND/NT, +BS  PSYCH: AAOx3  NEUROLOGY: CN II-XII grossly intact, moving all extremities  SKIN: No rashes or lesions  EXT: wwp, no cce    LABS:  CAPILLARY BLOOD GLUCOSE                              10.8   8.56  )-----------( 315      ( 01 Jan 2024 05:50 )             33.7     01-01    135  |  102  |  10  ----------------------------<  148<H>  4.4   |  26  |  0.52    Ca    9.5      01 Jan 2024 05:50    TPro  7.2  /  Alb  2.4<L>  /  TBili  0.4  /  DBili  x   /  AST  26  /  ALT  40  /  AlkPhos  237<H>  01-01          Urinalysis Basic - ( 01 Jan 2024 05:50 )    Color: x / Appearance: x / SG: x / pH: x  Gluc: 148 mg/dL / Ketone: x  / Bili: x / Urobili: x   Blood: x / Protein: x / Nitrite: x   Leuk Esterase: x / RBC: x / WBC x   Sq Epi: x / Non Sq Epi: x / Bacteria: x          RADIOLOGY & ADDITIONAL TESTS:    Telemetry Personally Reviewed -     Imaging Personally Reviewed -     Imaging Reviewed -     Consultant(s) Notes Reviewed -       Care Discussed with Consultants/Other Providers -

## 2024-01-01 NOTE — CHART NOTE - NSCHARTNOTEFT_GEN_A_CORE
Per discussion with Dr. Noonan, MRCP planning on TUE before lap clair on WED.
fever 102.4 overnight fever work up in progress    Urinalysis (12.27.23 @ 07:50)    Glucose Qualitative, Urine: Negative mg/dL    Blood, Urine: Moderate    pH Urine: 6.0    Color: Yellow    Urine Appearance: Clear    Bilirubin: Negative    Ketone - Urine: Negative mg/dL    Specific Gravity: <1.005    Protein, Urine: Trace mg/dL    Urobilinogen: 0.2 mg/dL    Nitrite: Negative    Leukocyte Esterase Concentration: Moderate  wbc 15   start ceftriaxone 1 gm for cystitis   fu urine culture
HPI:  78F w/ hx of HTN, DM p/w abdominal pain. Sx began several days prior but progressively worsening. Worse after eating food. Associated w/ NBNB emesis. Denies fever/chills/sweats, diarrhea, EtOH use.    In ED, pt w/ low grade fever to 37.5 and tachycardic to 100s. Labs notable for WBC 10.7, Alk phos 241, AST 97, lipase > 5000, lactate 2.9. CT AP w/ e/o pancreatitis. RUQ US w/ cholelithiasis and gallbladder wall thickening. (26 Dec 2023 07:03)    Spoke with patient's grandson Leon Gupta at bedside and had an extensive conversation reguarding the patient's current status, diagnostics, and treatment plan. All questions answered.
Pt seen at bedside for f/u.  Feels significantly better.   Abdominal pain resolved.  Denies nausea/vomiting.    AM labs reviewed                        10.9   11.55 )-----------( 217      ( 26 Dec 2023 06:05 )             33.3   12-26    140  |  105  |  15  ----------------------------<  151<H>  4.0   |  28  |  0.86    Ca    8.9      26 Dec 2023 06:05    TPro  6.9  /  Alb  3.3  /  TBili  0.7  /  DBili  x   /  AST  132<H>  /  ALT  94<H>  /  AlkPhos  111  12-26      Exam  Gen- A&Ox3  Abd- Soft, NTND     Plan  F/u MRCP to eval dilated CBD  Eventual lap clair planning  Continue bowel rest and IV hydration  Management per primary team

## 2024-01-01 NOTE — PROGRESS NOTE ADULT - SUBJECTIVE AND OBJECTIVE BOX
Patient seen and examined at bedside with no complaints. Patient was pleasantly eating her breakfast.   Tolerating regular diet.  Admits to flatus/BM.   Denies pain, nausea/ vomiting, chills, SOB, chest pain, calf tenderness.          Vital Signs Last 24 Hrs  T(F): 99 (01-01-24 @ 04:44), Max: 99 (01-01-24 @ 04:44)  HR: 79 (01-01-24 @ 04:44)  BP: 130/59 (01-01-24 @ 04:44)  RR: 18 (01-01-24 @ 04:44)  SpO2: 98% (01-01-24 @ 04:44)  Wt(kg): --   CAPILLARY BLOOD GLUCOSE          GENERAL: Alert, NAD  CHEST/LUNG: Respirations non labored, good inspiratory effort  HEART: S1S2  HEENT: NCAT, EOMI, conjunctiva clear   ABDOMEN: Nondistended, + bowel sounds, nontender  EXTREMITIES:  No calf tenderness, no edema    I&O's Detail      LABS:                        10.8   8.56  )-----------( 315      ( 01 Jan 2024 05:50 )             33.7     01-01    135  |  102  |  10  ----------------------------<  148<H>  4.4   |  26  |  0.52    Ca    9.5      01 Jan 2024 05:50    TPro  7.2  /  Alb  2.4<L>  /  TBili  0.4  /  DBili  x   /  AST  26  /  ALT  40  /  AlkPhos  237<H>  01-01

## 2024-01-01 NOTE — PROGRESS NOTE ADULT - ASSESSMENT
A/P    1. Acute gallstone pancreatitis:  - Pain improved  - off of fluids, encourage PO intake  - advanced diet to regular, tolerating  - Surgery following - to undergo CCY tomorrow  - fevers resolved  -Meeks score <1%. Patient does 30 minutes of aerobic exercise (brisk walking) daily and has no SOB/cp. Has unlimited exercise tolerance. METS >4. Not at increased risk of MACE related to hepatobiliary surgery.   -No further testing required prior to OR. Patient is optimized.    2. HTN:  - Well controlled    3. UTI:  - s/p IV ceftriaxone x3 days

## 2024-01-01 NOTE — PROGRESS NOTE ADULT - ASSESSMENT
77 Y/O female with gallstone pancreatitis now improving. Patient afebrile at present. LFTs WNL. Abdominal exam benign.       PLAN:     - Laparoscopic Cholecystectomy planning   - NPO after midnight; preoperative labs to be drawn tomorrow AM    - OOB as tolerated, walking as tolerated; DVT PPx, continue IVF    - Continue care per primary team   - Will D/W surgical attending

## 2024-01-02 ENCOUNTER — TRANSCRIPTION ENCOUNTER (OUTPATIENT)
Age: 79
End: 2024-01-02

## 2024-01-02 LAB
ALBUMIN SERPL ELPH-MCNC: 2.5 G/DL — LOW (ref 3.3–5)
ALBUMIN SERPL ELPH-MCNC: 2.5 G/DL — LOW (ref 3.3–5)
ALP SERPL-CCNC: 253 U/L — HIGH (ref 40–120)
ALP SERPL-CCNC: 253 U/L — HIGH (ref 40–120)
ALT FLD-CCNC: 54 U/L — SIGNIFICANT CHANGE UP (ref 12–78)
ALT FLD-CCNC: 54 U/L — SIGNIFICANT CHANGE UP (ref 12–78)
ANION GAP SERPL CALC-SCNC: 5 MMOL/L — SIGNIFICANT CHANGE UP (ref 5–17)
ANION GAP SERPL CALC-SCNC: 5 MMOL/L — SIGNIFICANT CHANGE UP (ref 5–17)
APTT BLD: 31.4 SEC — SIGNIFICANT CHANGE UP (ref 24.5–35.6)
APTT BLD: 31.4 SEC — SIGNIFICANT CHANGE UP (ref 24.5–35.6)
AST SERPL-CCNC: 30 U/L — SIGNIFICANT CHANGE UP (ref 15–37)
AST SERPL-CCNC: 30 U/L — SIGNIFICANT CHANGE UP (ref 15–37)
BILIRUB SERPL-MCNC: 0.3 MG/DL — SIGNIFICANT CHANGE UP (ref 0.2–1.2)
BILIRUB SERPL-MCNC: 0.3 MG/DL — SIGNIFICANT CHANGE UP (ref 0.2–1.2)
BLD GP AB SCN SERPL QL: SIGNIFICANT CHANGE UP
BLD GP AB SCN SERPL QL: SIGNIFICANT CHANGE UP
BUN SERPL-MCNC: 14 MG/DL — SIGNIFICANT CHANGE UP (ref 7–23)
BUN SERPL-MCNC: 14 MG/DL — SIGNIFICANT CHANGE UP (ref 7–23)
CALCIUM SERPL-MCNC: 9.3 MG/DL — SIGNIFICANT CHANGE UP (ref 8.5–10.1)
CALCIUM SERPL-MCNC: 9.3 MG/DL — SIGNIFICANT CHANGE UP (ref 8.5–10.1)
CHLORIDE SERPL-SCNC: 102 MMOL/L — SIGNIFICANT CHANGE UP (ref 96–108)
CHLORIDE SERPL-SCNC: 102 MMOL/L — SIGNIFICANT CHANGE UP (ref 96–108)
CO2 SERPL-SCNC: 25 MMOL/L — SIGNIFICANT CHANGE UP (ref 22–31)
CO2 SERPL-SCNC: 25 MMOL/L — SIGNIFICANT CHANGE UP (ref 22–31)
CREAT SERPL-MCNC: 0.62 MG/DL — SIGNIFICANT CHANGE UP (ref 0.5–1.3)
CREAT SERPL-MCNC: 0.62 MG/DL — SIGNIFICANT CHANGE UP (ref 0.5–1.3)
EGFR: 92 ML/MIN/1.73M2 — SIGNIFICANT CHANGE UP
EGFR: 92 ML/MIN/1.73M2 — SIGNIFICANT CHANGE UP
GLUCOSE SERPL-MCNC: 162 MG/DL — HIGH (ref 70–99)
GLUCOSE SERPL-MCNC: 162 MG/DL — HIGH (ref 70–99)
HCT VFR BLD CALC: 34.3 % — LOW (ref 34.5–45)
HCT VFR BLD CALC: 34.3 % — LOW (ref 34.5–45)
HGB BLD-MCNC: 11.5 G/DL — SIGNIFICANT CHANGE UP (ref 11.5–15.5)
HGB BLD-MCNC: 11.5 G/DL — SIGNIFICANT CHANGE UP (ref 11.5–15.5)
INR BLD: 0.95 RATIO — SIGNIFICANT CHANGE UP (ref 0.85–1.18)
INR BLD: 0.95 RATIO — SIGNIFICANT CHANGE UP (ref 0.85–1.18)
MCHC RBC-ENTMCNC: 29.8 PG — SIGNIFICANT CHANGE UP (ref 27–34)
MCHC RBC-ENTMCNC: 29.8 PG — SIGNIFICANT CHANGE UP (ref 27–34)
MCHC RBC-ENTMCNC: 33.5 G/DL — SIGNIFICANT CHANGE UP (ref 32–36)
MCHC RBC-ENTMCNC: 33.5 G/DL — SIGNIFICANT CHANGE UP (ref 32–36)
MCV RBC AUTO: 88.9 FL — SIGNIFICANT CHANGE UP (ref 80–100)
MCV RBC AUTO: 88.9 FL — SIGNIFICANT CHANGE UP (ref 80–100)
NRBC # BLD: 0 /100 WBCS — SIGNIFICANT CHANGE UP (ref 0–0)
NRBC # BLD: 0 /100 WBCS — SIGNIFICANT CHANGE UP (ref 0–0)
PLATELET # BLD AUTO: 373 K/UL — SIGNIFICANT CHANGE UP (ref 150–400)
PLATELET # BLD AUTO: 373 K/UL — SIGNIFICANT CHANGE UP (ref 150–400)
POTASSIUM SERPL-MCNC: 4.6 MMOL/L — SIGNIFICANT CHANGE UP (ref 3.5–5.3)
POTASSIUM SERPL-MCNC: 4.6 MMOL/L — SIGNIFICANT CHANGE UP (ref 3.5–5.3)
POTASSIUM SERPL-SCNC: 4.6 MMOL/L — SIGNIFICANT CHANGE UP (ref 3.5–5.3)
POTASSIUM SERPL-SCNC: 4.6 MMOL/L — SIGNIFICANT CHANGE UP (ref 3.5–5.3)
PROT SERPL-MCNC: 7.4 GM/DL — SIGNIFICANT CHANGE UP (ref 6–8.3)
PROT SERPL-MCNC: 7.4 GM/DL — SIGNIFICANT CHANGE UP (ref 6–8.3)
PROTHROM AB SERPL-ACNC: 11.3 SEC — SIGNIFICANT CHANGE UP (ref 9.5–13)
PROTHROM AB SERPL-ACNC: 11.3 SEC — SIGNIFICANT CHANGE UP (ref 9.5–13)
RBC # BLD: 3.86 M/UL — SIGNIFICANT CHANGE UP (ref 3.8–5.2)
RBC # BLD: 3.86 M/UL — SIGNIFICANT CHANGE UP (ref 3.8–5.2)
RBC # FLD: 12.6 % — SIGNIFICANT CHANGE UP (ref 10.3–14.5)
RBC # FLD: 12.6 % — SIGNIFICANT CHANGE UP (ref 10.3–14.5)
SODIUM SERPL-SCNC: 132 MMOL/L — LOW (ref 135–145)
SODIUM SERPL-SCNC: 132 MMOL/L — LOW (ref 135–145)
WBC # BLD: 10.47 K/UL — SIGNIFICANT CHANGE UP (ref 3.8–10.5)
WBC # BLD: 10.47 K/UL — SIGNIFICANT CHANGE UP (ref 3.8–10.5)
WBC # FLD AUTO: 10.47 K/UL — SIGNIFICANT CHANGE UP (ref 3.8–10.5)
WBC # FLD AUTO: 10.47 K/UL — SIGNIFICANT CHANGE UP (ref 3.8–10.5)

## 2024-01-02 PROCEDURE — 99233 SBSQ HOSP IP/OBS HIGH 50: CPT | Mod: FS

## 2024-01-02 PROCEDURE — 99232 SBSQ HOSP IP/OBS MODERATE 35: CPT

## 2024-01-02 PROCEDURE — 74181 MRI ABDOMEN W/O CONTRAST: CPT | Mod: 26

## 2024-01-02 RX ORDER — SODIUM CHLORIDE 9 MG/ML
1000 INJECTION, SOLUTION INTRAVENOUS
Refills: 0 | Status: DISCONTINUED | OUTPATIENT
Start: 2024-01-02 | End: 2024-01-03

## 2024-01-02 RX ADMIN — HEPARIN SODIUM 5000 UNIT(S): 5000 INJECTION INTRAVENOUS; SUBCUTANEOUS at 05:31

## 2024-01-02 RX ADMIN — Medication 100 MILLIGRAM(S): at 19:52

## 2024-01-02 RX ADMIN — HEPARIN SODIUM 5000 UNIT(S): 5000 INJECTION INTRAVENOUS; SUBCUTANEOUS at 14:33

## 2024-01-02 RX ADMIN — SODIUM CHLORIDE 75 MILLILITER(S): 9 INJECTION, SOLUTION INTRAVENOUS at 19:04

## 2024-01-02 NOTE — PROGRESS NOTE ADULT - ASSESSMENT
78F w/ hx of HTN, DM p/w abdominal pain. Sx began several days prior but progressively worsening.  Admitted to med surg for acute gallstone pancreatitis.    1. Acute gallstone pancreatitis:  - Pain improved, on regular diet.    - Surgery following - for MRCP today  - lap clair tomorrow.    -Meeks score <1%. Patient does 30 minutes of aerobic exercise (brisk walking) daily and has no SOB/cp. Has unlimited exercise tolerance. METS >4. Not at increased risk of MACE related to hepatobiliary surgery.   -No further testing required prior to OR. Patient is optimized.    2. HTN:  - Well controlled    3. UTI:  - s/p IV ceftriaxone x3 days

## 2024-01-02 NOTE — DIETITIAN INITIAL EVALUATION ADULT - WEIGHT FOR BMI (LBS)
Emilia Johnson    [id number]    [facility] Same Day Surgery Update H & P  Department of General Surgery   Surgical Service   Pre-operative History and Physical  Last H & P within the last 30 days. DIAGNOSIS:   Retention of urine [R33.9]    PROCEDURE:  CO TRANSURETHRAL ELEC-SURG PROSTATECTOM [65699] (CYSTOSCOPY TRANSURETHRAL RESECTION PROSTATE)      HISTORY OF PRESENT ILLNESS:   Patient has urinary retention. Covid 19:  Patient denies fever, chills, cough or known exposure to Covid-19.   Patient reports they have not been quarantined at home since Covid-19 test.      Past Medical History:        Diagnosis Date    Acute myocardial infarction, unspecified site, episode of care unspecified 1998    s/p MI    Arthritis     Back pain     Coronary atherosclerosis of unspecified type of vessel, native or graft     patient denies this    [facility] 8/4/1822    Dysmetabolic syndrome X     Hematuria, unspecified     Hypertrophy of prostate without urinary obstruction and other lower urinary tract symptoms (LUTS)     Other and unspecified hyperlipidemia     Prostatitis, unspecified     Right hip pain 3/23/2012    Screening PSA (prostate specific antigen) 3/29/2010    0.6    Spinal stenosis, unspecified region other than cervical     Stool blood 1/26/2009    Guiac Negative    Unspecified sleep apnea     bibap    Unspecified vitamin D deficiency      Past Surgical History:        Procedure Laterality Date    ANGIOPLASTY      COLONOSCOPY N/A 7/26/2019    COLONOSCOPY DIAGNOSTIC performed by Prateek Fitzpatrick MD at [place]      back x 2, 8/2017    JOINT REPLACEMENT Right     hip    OTHER SURGICAL HISTORY      interstim lower right back    OTHER SURGICAL HISTORY  08/03/2017    removal of interstim device     Past Social History:  Social History     Socioeconomic History    Marital status: Single     Spouse name: None    Number of children: None    Years of education: None    Highest education level: None   Occupational History    None   Social Needs    Financial resource strain: None    Food insecurity     Worry: None     Inability: None    Transportation needs     Medical: None     Non-medical: None   Tobacco Use    Smoking status: Former Smoker     Years: 9.00     Last attempt to quit: 1998     Years since quittin.0    Smokeless tobacco: Never Used   Substance and Sexual Activity    Alcohol use: Yes     Alcohol/week: 6.0 standard drinks     Types: 6 Cans of beer per week    Drug use: No    Sexual activity: None   Lifestyle    Physical activity     Days per week: None     Minutes per session: None    Stress: None   Relationships    Social connections     Talks on phone: None     Gets together: None     Attends Je[employer] witness service: None     Active member of club or organization: None     Attends meetings of clubs or organizations: None     Relationship status: None    Intimate partner violence     Fear of current or ex partner: None     Emotionally abused: None     Physically abused: None     Forced sexual activity: None   Other Topics Concern    None   Social History Narrative    None         Medications Prior to Admission:      Prior to Admission medications    Medication Sig Start Date End Date Taking? Authorizing Provider   melatonin 5 MG TABS tablet Take 5 mg by mouth nightly   Yes Historical Provider, MD   tamsulosin (FLOMAX) 0.4 MG capsule Take 0.4 mg by mouth daily   Yes Historical Provider, MD   atorvastatin (LIPITOR) 40 MG tablet TAKE 1 TABLET DAILY  Patient taking differently: 80 mg daily TAKE 1 TABLET DAILY 13  Yes Saji Walker MD   metformin (GLUCOPHAGE) 1000 MG tablet Take 1 tablet by mouth 2 times daily (with meals).   Patient taking differently: Take 500 mg by mouth daily (with breakfast)  13  Yes Maria Del Carmen Moy MD   aspirin 81 MG tablet Take 81 mg by mouth daily    Historical Provider, MD         Allergies:  Patient has no known allergies. PHYSICAL EXAM:      /83   Pulse 75   Temp 97.7 °F (36.5 °C) (Oral)   Resp 18   Ht 6' (1.829 m)   Wt 233 lb (105.7 kg)   SpO2 94%   BMI 31.60 kg/m²      Airway:  Airway patent with no audible stridor    Heart:  regular rate and rhythm, No murmur noted    Lungs:  No increased work of breathing, good air exchange, clear to auscultation bilaterally, no crackles or wheezing    Abdomen:  soft, non-distended, non-tender, no rebound tenderness or guarding, normal active bowel sounds and no masses palpated    ASSESSMENT AND PLAN     Patient is a 81 y.o. male with above specified procedure planned. 1.  Patient seen and focused exam done today- no new changes since last physical exam on 8-7-2020    2. Access to ancillary services are available per request of the provider.     [place], [place]     8/13/2020 119.9

## 2024-01-02 NOTE — DIETITIAN INITIAL EVALUATION ADULT - NS FNS DIET ORDER
Diet, NPO after Midnight:      NPO Start Date: 01-Jan-2024,   NPO Start Time: 23:59 (01-01-24 @ 18:07) [Active]  Diet, Regular (12-31-23 @ 10:52) [Active]

## 2024-01-02 NOTE — DIETITIAN INITIAL EVALUATION ADULT - PERTINENT LABORATORY DATA
01-02    132<L>  |  102  |  14  ----------------------------<  162<H>  4.6   |  25  |  0.62    Ca    9.3      02 Jan 2024 06:15    TPro  7.4  /  Alb  2.5<L>  /  TBili  0.3  /  DBili  x   /  AST  30  /  ALT  54  /  AlkPhos  253<H>  01-02

## 2024-01-02 NOTE — PROGRESS NOTE ADULT - SUBJECTIVE AND OBJECTIVE BOX
Patient seen and examined bedside resting comfortably.  No complaints offered.     T(F): 98.2 (01-02-24 @ 04:29), Max: 98.5 (01-01-24 @ 23:36)  HR: 64 (01-02-24 @ 04:29) (64 - 91)  BP: 145/64 (01-02-24 @ 04:29) (134/74 - 156/70)  RR: 18 (01-02-24 @ 04:29) (18 - 18)  SpO2: 96% (01-02-24 @ 04:29) (96% - 98%)  Wt(kg): --  CAPILLARY BLOOD GLUCOSE          PHYSICAL EXAM:  General: NAD  Neuro:  Alert & oriented x 3  Lung:respirations nonlabored, good inspiratory effort  Abdomen: soft, NTND.   Extremities: no pedal edema or calf tenderness noted     LABS:                        11.5   10.47 )-----------( 373      ( 02 Jan 2024 06:15 )             34.3     01-02    132<L>  |  102  |  14  ----------------------------<  162<H>  4.6   |  25  |  0.62    Ca    9.3      02 Jan 2024 06:15    TPro  7.4  /  Alb  2.5<L>  /  TBili  0.3  /  DBili  x   /  AST  30  /  ALT  54  /  AlkPhos  253<H>  01-02    PT/INR - ( 02 Jan 2024 06:15 )   PT: 11.3 sec;   INR: 0.95 ratio         PTT - ( 02 Jan 2024 06:15 )  PTT:31.4 sec    I&O:     Culture Results:   No growth at 5 days (12-27 @ 05:42)      A/P: 77 yo F admitted with gallstone pancreatitis, now improving. now afebrile, without any abdominal pain.  -f/u MRCP, keep NPO until performed  -OR booked for standby tomorrow for mann hurde with Dr Byron Noonan: NPO @ MN, preop labs  -continue care per primary team  -Case discussed with Dr Lieberman    Patient seen and examined bedside resting comfortably.  No complaints offered.     T(F): 98.2 (01-02-24 @ 04:29), Max: 98.5 (01-01-24 @ 23:36)  HR: 64 (01-02-24 @ 04:29) (64 - 91)  BP: 145/64 (01-02-24 @ 04:29) (134/74 - 156/70)  RR: 18 (01-02-24 @ 04:29) (18 - 18)  SpO2: 96% (01-02-24 @ 04:29) (96% - 98%)  Wt(kg): --  CAPILLARY BLOOD GLUCOSE          PHYSICAL EXAM:  General: NAD  Neuro:  Alert & oriented x 3  Lung:respirations nonlabored, good inspiratory effort  Abdomen: soft, NTND.   Extremities: no pedal edema or calf tenderness noted     LABS:                        11.5   10.47 )-----------( 373      ( 02 Jan 2024 06:15 )             34.3     01-02    132<L>  |  102  |  14  ----------------------------<  162<H>  4.6   |  25  |  0.62    Ca    9.3      02 Jan 2024 06:15    TPro  7.4  /  Alb  2.5<L>  /  TBili  0.3  /  DBili  x   /  AST  30  /  ALT  54  /  AlkPhos  253<H>  01-02    PT/INR - ( 02 Jan 2024 06:15 )   PT: 11.3 sec;   INR: 0.95 ratio         PTT - ( 02 Jan 2024 06:15 )  PTT:31.4 sec    I&O:     Culture Results:   No growth at 5 days (12-27 @ 05:42)      A/P: 79 yo F admitted with gallstone pancreatitis, now improving. now afebrile, without any abdominal pain.  -f/u MRCP, keep NPO until performed  -OR booked for standby tomorrow for mann hurde with Dr Byron Noonan: NPO @ MN, preop labs  -continue care per primary team  -Case discussed with Dr Lieberman

## 2024-01-02 NOTE — DIETITIAN INITIAL EVALUATION ADULT - OTHER INFO
Pt states decreased appetite and PO intake for last 8 days. Consuming mostly 51-75% of meals during LOS. Currently NPO for possible lap clair today(01/02). Denies any chewing or swallowing difficulty. States she has DM and tries to monitor sugar intake; regularly eats fish, vegetables, salads. Takes Metformin to control her BG levels PTA. Will change diet to low fat, consistent CHO diet due to pt with DM and gallstone pancreatitis. Denies any current nausea or vomiting. +flatus/BM.  States ht 5'2" and -128 lbs.; request current wt to confirm accuracy of admission wt.

## 2024-01-02 NOTE — DIETITIAN INITIAL EVALUATION ADULT - PERTINENT MEDS FT
MEDICATIONS  (STANDING):  heparin   Injectable 5000 Unit(s) SubCutaneous every 8 hours  influenza  Vaccine (HIGH DOSE) 0.7 milliLiter(s) IntraMuscular once  lactated ringers. 1000 milliLiter(s) (75 mL/Hr) IV Continuous <Continuous>    MEDICATIONS  (PRN):  acetaminophen     Tablet .. 650 milliGRAM(s) Oral every 6 hours PRN Temp greater or equal to 38C (100.4F), Mild Pain (1 - 3)  albuterol    90 MICROgram(s) HFA Inhaler 2 Puff(s) Inhalation every 6 hours PRN Shortness of Breath and/or Wheezing  aluminum hydroxide/magnesium hydroxide/simethicone Suspension 30 milliLiter(s) Oral every 4 hours PRN Dyspepsia  guaiFENesin Oral Liquid (Sugar-Free) 100 milliGRAM(s) Oral every 6 hours PRN Cough  melatonin 3 milliGRAM(s) Oral at bedtime PRN Insomnia  ondansetron Injectable 4 milliGRAM(s) IV Push every 8 hours PRN Nausea and/or Vomiting

## 2024-01-02 NOTE — PROGRESS NOTE ADULT - SUBJECTIVE AND OBJECTIVE BOX
78F w/ hx of HTN, DM p/w abdominal pain. Sx began several days prior but progressively worsening.  Admitted to med surg for acute gallstone pancreatitis.      Patient seen and examined at the bedside  No acute distress  Reports her abdominal pain is much improved, denies vomiting  For MRCP today, lap clair tomorrow as per surgical team      MEDICATIONS  (STANDING):  heparin   Injectable 5000 Unit(s) SubCutaneous every 8 hours  influenza  Vaccine (HIGH DOSE) 0.7 milliLiter(s) IntraMuscular once    MEDICATIONS  (PRN):  acetaminophen     Tablet .. 650 milliGRAM(s) Oral every 6 hours PRN Temp greater or equal to 38C (100.4F), Mild Pain (1 - 3)  albuterol    90 MICROgram(s) HFA Inhaler 2 Puff(s) Inhalation every 6 hours PRN Shortness of Breath and/or Wheezing  aluminum hydroxide/magnesium hydroxide/simethicone Suspension 30 milliLiter(s) Oral every 4 hours PRN Dyspepsia  guaiFENesin Oral Liquid (Sugar-Free) 100 milliGRAM(s) Oral every 6 hours PRN Cough  melatonin 3 milliGRAM(s) Oral at bedtime PRN Insomnia  ondansetron Injectable 4 milliGRAM(s) IV Push every 8 hours PRN Nausea and/or Vomiting      PHYSICAL EXAM:  T(C): 37.2 (01-01-24 @ 04:44), Max: 37.2 (01-01-24 @ 04:44)  HR: 79 (01-01-24 @ 04:44) (79 - 89)  BP: 130/59 (01-01-24 @ 04:44) (120/70 - 148/69)  RR: 18 (01-01-24 @ 04:44) (18 - 18)  SpO2: 98% (01-01-24 @ 04:44) (95% - 98%)  I&O's Summary    GENERAL: NAD, lying in bed  HEAD:  Atraumatic, Normocephalic, MMM  CHEST/LUNG: No use of accessory muscles, CTAB, breathing non-labored  COR: RR, no mrcg  ABD: Soft, ND/NT, +BS  PSYCH: AAOx3  NEUROLOGY: CN II-XII grossly intact, moving all extremities  SKIN: No rashes or lesions  EXT: wwp, no cce    LABS:  CAPILLARY BLOOD GLUCOSE                              10.8   8.56  )-----------( 315      ( 01 Jan 2024 05:50 )             33.7     01-01    135  |  102  |  10  ----------------------------<  148<H>  4.4   |  26  |  0.52    Ca    9.5      01 Jan 2024 05:50    TPro  7.2  /  Alb  2.4<L>  /  TBili  0.4  /  DBili  x   /  AST  26  /  ALT  40  /  AlkPhos  237<H>  01-01          Urinalysis Basic - ( 01 Jan 2024 05:50 )    Color: x / Appearance: x / SG: x / pH: x  Gluc: 148 mg/dL / Ketone: x  / Bili: x / Urobili: x   Blood: x / Protein: x / Nitrite: x   Leuk Esterase: x / RBC: x / WBC x   Sq Epi: x / Non Sq Epi: x / Bacteria: x          RADIOLOGY & ADDITIONAL TESTS:    Telemetry Personally Reviewed -     Imaging Personally Reviewed -     Imaging Reviewed -     Consultant(s) Notes Reviewed -       Care Discussed with Consultants/Other Providers -

## 2024-01-02 NOTE — DIETITIAN INITIAL EVALUATION ADULT - PERSON TAUGHT/METHOD
Provided pt with low fat, consistent CHO diet education/verbal instruction/written material/patient instructed

## 2024-01-03 ENCOUNTER — TRANSCRIPTION ENCOUNTER (OUTPATIENT)
Age: 79
End: 2024-01-03

## 2024-01-03 ENCOUNTER — RESULT REVIEW (OUTPATIENT)
Age: 79
End: 2024-01-03

## 2024-01-03 LAB
ANION GAP SERPL CALC-SCNC: 5 MMOL/L — SIGNIFICANT CHANGE UP (ref 5–17)
ANION GAP SERPL CALC-SCNC: 5 MMOL/L — SIGNIFICANT CHANGE UP (ref 5–17)
BUN SERPL-MCNC: 13 MG/DL — SIGNIFICANT CHANGE UP (ref 7–23)
BUN SERPL-MCNC: 13 MG/DL — SIGNIFICANT CHANGE UP (ref 7–23)
CALCIUM SERPL-MCNC: 9.1 MG/DL — SIGNIFICANT CHANGE UP (ref 8.5–10.1)
CALCIUM SERPL-MCNC: 9.1 MG/DL — SIGNIFICANT CHANGE UP (ref 8.5–10.1)
CHLORIDE SERPL-SCNC: 104 MMOL/L — SIGNIFICANT CHANGE UP (ref 96–108)
CHLORIDE SERPL-SCNC: 104 MMOL/L — SIGNIFICANT CHANGE UP (ref 96–108)
CO2 SERPL-SCNC: 25 MMOL/L — SIGNIFICANT CHANGE UP (ref 22–31)
CO2 SERPL-SCNC: 25 MMOL/L — SIGNIFICANT CHANGE UP (ref 22–31)
CREAT SERPL-MCNC: 0.57 MG/DL — SIGNIFICANT CHANGE UP (ref 0.5–1.3)
CREAT SERPL-MCNC: 0.57 MG/DL — SIGNIFICANT CHANGE UP (ref 0.5–1.3)
EGFR: 94 ML/MIN/1.73M2 — SIGNIFICANT CHANGE UP
EGFR: 94 ML/MIN/1.73M2 — SIGNIFICANT CHANGE UP
GLUCOSE SERPL-MCNC: 155 MG/DL — HIGH (ref 70–99)
GLUCOSE SERPL-MCNC: 155 MG/DL — HIGH (ref 70–99)
HCT VFR BLD CALC: 34.1 % — LOW (ref 34.5–45)
HCT VFR BLD CALC: 34.1 % — LOW (ref 34.5–45)
HGB BLD-MCNC: 11.1 G/DL — LOW (ref 11.5–15.5)
HGB BLD-MCNC: 11.1 G/DL — LOW (ref 11.5–15.5)
MAGNESIUM SERPL-MCNC: 2.1 MG/DL — SIGNIFICANT CHANGE UP (ref 1.6–2.6)
MAGNESIUM SERPL-MCNC: 2.1 MG/DL — SIGNIFICANT CHANGE UP (ref 1.6–2.6)
MCHC RBC-ENTMCNC: 29.5 PG — SIGNIFICANT CHANGE UP (ref 27–34)
MCHC RBC-ENTMCNC: 29.5 PG — SIGNIFICANT CHANGE UP (ref 27–34)
MCHC RBC-ENTMCNC: 32.6 G/DL — SIGNIFICANT CHANGE UP (ref 32–36)
MCHC RBC-ENTMCNC: 32.6 G/DL — SIGNIFICANT CHANGE UP (ref 32–36)
MCV RBC AUTO: 90.7 FL — SIGNIFICANT CHANGE UP (ref 80–100)
MCV RBC AUTO: 90.7 FL — SIGNIFICANT CHANGE UP (ref 80–100)
NRBC # BLD: 0 /100 WBCS — SIGNIFICANT CHANGE UP (ref 0–0)
NRBC # BLD: 0 /100 WBCS — SIGNIFICANT CHANGE UP (ref 0–0)
PHOSPHATE SERPL-MCNC: 3.9 MG/DL — SIGNIFICANT CHANGE UP (ref 2.5–4.5)
PHOSPHATE SERPL-MCNC: 3.9 MG/DL — SIGNIFICANT CHANGE UP (ref 2.5–4.5)
PLATELET # BLD AUTO: 390 K/UL — SIGNIFICANT CHANGE UP (ref 150–400)
PLATELET # BLD AUTO: 390 K/UL — SIGNIFICANT CHANGE UP (ref 150–400)
POTASSIUM SERPL-MCNC: 4.3 MMOL/L — SIGNIFICANT CHANGE UP (ref 3.5–5.3)
POTASSIUM SERPL-MCNC: 4.3 MMOL/L — SIGNIFICANT CHANGE UP (ref 3.5–5.3)
POTASSIUM SERPL-SCNC: 4.3 MMOL/L — SIGNIFICANT CHANGE UP (ref 3.5–5.3)
POTASSIUM SERPL-SCNC: 4.3 MMOL/L — SIGNIFICANT CHANGE UP (ref 3.5–5.3)
RBC # BLD: 3.76 M/UL — LOW (ref 3.8–5.2)
RBC # BLD: 3.76 M/UL — LOW (ref 3.8–5.2)
RBC # FLD: 12.7 % — SIGNIFICANT CHANGE UP (ref 10.3–14.5)
RBC # FLD: 12.7 % — SIGNIFICANT CHANGE UP (ref 10.3–14.5)
SODIUM SERPL-SCNC: 134 MMOL/L — LOW (ref 135–145)
SODIUM SERPL-SCNC: 134 MMOL/L — LOW (ref 135–145)
WBC # BLD: 9.55 K/UL — SIGNIFICANT CHANGE UP (ref 3.8–10.5)
WBC # BLD: 9.55 K/UL — SIGNIFICANT CHANGE UP (ref 3.8–10.5)
WBC # FLD AUTO: 9.55 K/UL — SIGNIFICANT CHANGE UP (ref 3.8–10.5)
WBC # FLD AUTO: 9.55 K/UL — SIGNIFICANT CHANGE UP (ref 3.8–10.5)

## 2024-01-03 PROCEDURE — 99232 SBSQ HOSP IP/OBS MODERATE 35: CPT

## 2024-01-03 PROCEDURE — 47562 LAPAROSCOPIC CHOLECYSTECTOMY: CPT

## 2024-01-03 PROCEDURE — 88304 TISSUE EXAM BY PATHOLOGIST: CPT | Mod: 26

## 2024-01-03 DEVICE — CLIP APPLIER COVIDIEN ENDOCLIP III 5MM: Type: IMPLANTABLE DEVICE | Status: FUNCTIONAL

## 2024-01-03 RX ORDER — FENTANYL CITRATE 50 UG/ML
25 INJECTION INTRAVENOUS
Refills: 0 | Status: DISCONTINUED | OUTPATIENT
Start: 2024-01-03 | End: 2024-01-03

## 2024-01-03 RX ORDER — ACETAMINOPHEN 500 MG
1000 TABLET ORAL ONCE
Refills: 0 | Status: COMPLETED | OUTPATIENT
Start: 2024-01-03 | End: 2024-01-03

## 2024-01-03 RX ORDER — OXYCODONE HYDROCHLORIDE 5 MG/1
2.5 TABLET ORAL EVERY 6 HOURS
Refills: 0 | Status: DISCONTINUED | OUTPATIENT
Start: 2024-01-03 | End: 2024-01-04

## 2024-01-03 RX ORDER — OXYCODONE HYDROCHLORIDE 5 MG/1
5 TABLET ORAL EVERY 6 HOURS
Refills: 0 | Status: DISCONTINUED | OUTPATIENT
Start: 2024-01-03 | End: 2024-01-04

## 2024-01-03 RX ORDER — ACETAMINOPHEN 500 MG
1000 TABLET ORAL ONCE
Refills: 0 | Status: DISCONTINUED | OUTPATIENT
Start: 2024-01-03 | End: 2024-01-03

## 2024-01-03 RX ORDER — SODIUM CHLORIDE 9 MG/ML
1000 INJECTION, SOLUTION INTRAVENOUS
Refills: 0 | Status: DISCONTINUED | OUTPATIENT
Start: 2024-01-03 | End: 2024-01-03

## 2024-01-03 RX ADMIN — HEPARIN SODIUM 5000 UNIT(S): 5000 INJECTION INTRAVENOUS; SUBCUTANEOUS at 22:27

## 2024-01-03 RX ADMIN — SODIUM CHLORIDE 75 MILLILITER(S): 9 INJECTION, SOLUTION INTRAVENOUS at 10:41

## 2024-01-03 RX ADMIN — HEPARIN SODIUM 5000 UNIT(S): 5000 INJECTION INTRAVENOUS; SUBCUTANEOUS at 13:30

## 2024-01-03 RX ADMIN — Medication 100 MILLIGRAM(S): at 22:28

## 2024-01-03 RX ADMIN — ALBUTEROL 2 PUFF(S): 90 AEROSOL, METERED ORAL at 22:27

## 2024-01-03 RX ADMIN — Medication 400 MILLIGRAM(S): at 10:40

## 2024-01-03 NOTE — PROGRESS NOTE ADULT - SUBJECTIVE AND OBJECTIVE BOX
78F w/ hx of HTN, DM p/w abdominal pain. Sx began several days prior but progressively worsening.  Admitted to med surg for acute gallstone pancreatitis.      Patient seen and examined at the bedside  No acute distress  Reports her abdominal pain is much improved, denies vomiting  Had lap clair today with surgical team  Can start DC planning 1/4   PT recs - Home with PT      MEDICATIONS  (STANDING):  heparin   Injectable 5000 Unit(s) SubCutaneous every 8 hours  influenza  Vaccine (HIGH DOSE) 0.7 milliLiter(s) IntraMuscular once    MEDICATIONS  (PRN):  acetaminophen     Tablet .. 650 milliGRAM(s) Oral every 6 hours PRN Temp greater or equal to 38C (100.4F), Mild Pain (1 - 3)  albuterol    90 MICROgram(s) HFA Inhaler 2 Puff(s) Inhalation every 6 hours PRN Shortness of Breath and/or Wheezing  aluminum hydroxide/magnesium hydroxide/simethicone Suspension 30 milliLiter(s) Oral every 4 hours PRN Dyspepsia  guaiFENesin Oral Liquid (Sugar-Free) 100 milliGRAM(s) Oral every 6 hours PRN Cough  melatonin 3 milliGRAM(s) Oral at bedtime PRN Insomnia  ondansetron Injectable 4 milliGRAM(s) IV Push every 8 hours PRN Nausea and/or Vomiting      PHYSICAL EXAM:  T(C): 37.2 (01-01-24 @ 04:44), Max: 37.2 (01-01-24 @ 04:44)  HR: 79 (01-01-24 @ 04:44) (79 - 89)  BP: 130/59 (01-01-24 @ 04:44) (120/70 - 148/69)  RR: 18 (01-01-24 @ 04:44) (18 - 18)  SpO2: 98% (01-01-24 @ 04:44) (95% - 98%)  I&O's Summary    GENERAL: NAD, lying in bed  HEAD:  Atraumatic, Normocephalic, MMM  CHEST/LUNG: No use of accessory muscles, CTAB, breathing non-labored  COR: RR, no mrcg  ABD: Soft, ND/NT, +BS  PSYCH: AAOx3  NEUROLOGY: CN II-XII grossly intact, moving all extremities  SKIN: No rashes or lesions  EXT: wwp, no cce    LABS:  CAPILLARY BLOOD GLUCOSE                              10.8   8.56  )-----------( 315      ( 01 Jan 2024 05:50 )             33.7     01-01    135  |  102  |  10  ----------------------------<  148<H>  4.4   |  26  |  0.52    Ca    9.5      01 Jan 2024 05:50    TPro  7.2  /  Alb  2.4<L>  /  TBili  0.4  /  DBili  x   /  AST  26  /  ALT  40  /  AlkPhos  237<H>  01-01          Urinalysis Basic - ( 01 Jan 2024 05:50 )    Color: x / Appearance: x / SG: x / pH: x  Gluc: 148 mg/dL / Ketone: x  / Bili: x / Urobili: x   Blood: x / Protein: x / Nitrite: x   Leuk Esterase: x / RBC: x / WBC x   Sq Epi: x / Non Sq Epi: x / Bacteria: x          RADIOLOGY & ADDITIONAL TESTS:    Telemetry Personally Reviewed -     Imaging Personally Reviewed -     Imaging Reviewed -     Consultant(s) Notes Reviewed -       Care Discussed with Consultants/Other Providers -

## 2024-01-03 NOTE — PROGRESS NOTE ADULT - ASSESSMENT
78F w/ hx of HTN, DM p/w abdominal pain. Sx began several days prior but progressively worsening.  Admitted to med surg for acute gallstone pancreatitis.    1. Acute gallstone pancreatitis:  - Pain improved, on regular diet.    - s/p lap clair day 0.  Cleared from surgical standpoint  - DC planning from tomorrow 1/4       2. HTN:  - Well controlled    3. UTI:  - s/p IV ceftriaxone x3 days

## 2024-01-03 NOTE — PROGRESS NOTE ADULT - SUBJECTIVE AND OBJECTIVE BOX
Postoperative Day #:0 s/p lap clair  Patient seen and examined bedside resting comfortably.  No complaints offered. Abdominal pain is well controlled.  Denies nausea, vomiting, diarrhea, fevers, chills. hungry, but has not eaten yet.       T(F): 97.2 (01-03-24 @ 14:20), Max: 98.8 (01-03-24 @ 11:20)  HR: 78 (01-03-24 @ 14:21) (64 - 78)  BP: 142/64 (01-03-24 @ 14:21) (109/61 - 154/61)  RR: 18 (01-03-24 @ 14:21) (10 - 23)  SpO2: 99% (01-03-24 @ 14:21) (95% - 99%)  Wt(kg): --  CAPILLARY BLOOD GLUCOSE          PHYSICAL EXAM:  General: NAD  Neuro:  Alert & oriented x 3  HEENT: NCAT, EOMI, conjunctiva clear  CV: +S1+S2 regular rate and rhythm  Lung:respirations nonlabored, good inspiratory effort  Abdomen: soft, nondistended. laparoscopic incisions with pink foam dressings C/D/I  Extremities: no pedal edema or calf tenderness noted     LABS:                        11.1   9.55  )-----------( 390      ( 03 Jan 2024 05:30 )             34.1     01-03    134<L>  |  104  |  13  ----------------------------<  155<H>  4.3   |  25  |  0.57    Ca    9.1      03 Jan 2024 05:30  Phos  3.9     01-03  Mg     2.1     01-03    TPro  7.4  /  Alb  2.5<L>  /  TBili  0.3  /  DBili  x   /  AST  30  /  ALT  54  /  AlkPhos  253<H>  01-02    PT/INR - ( 02 Jan 2024 06:15 )   PT: 11.3 sec;   INR: 0.95 ratio         PTT - ( 02 Jan 2024 06:15 )  PTT:31.4 sec    I&O:         A/P: 77 yo F here with gallstone pancreatitis, POD0 s/p laparoscopic cholecystectomy   -multimodal pain control   -Increase activity with OOB, Ambulate  -c/w proper incentive spirometry use  -pt cleared for d/c form surgical standpoint, discussed with Dr Byron Noonan    Follow up with Dr. Byron Noonan in 1-2 weeks. Please have pt call [phone] to schedule an appointment.   CALL YOUR SURGEON at above number IF: You have any bleeding that does not stop, any pus draining from your wound, any fever (over 100.4 F) or chills, persistent nausea/vomiting, persistent diarrhea, or if your pain is not controlled on your discharge pain medications. No heavy lifting until cleared by surgeon.  Wound: You may take off outer pink dressing and shower after 48 hours. After showering, pat steri strips dry. Leave the white steri strips in place, they will fall off on their own in approximately 5-7 days or they will be removed at your follow up appointment.  Postoperative Day #:0 s/p lap clair  Patient seen and examined bedside resting comfortably.  No complaints offered. Abdominal pain is well controlled.  Denies nausea, vomiting, diarrhea, fevers, chills. hungry, but has not eaten yet.       T(F): 97.2 (01-03-24 @ 14:20), Max: 98.8 (01-03-24 @ 11:20)  HR: 78 (01-03-24 @ 14:21) (64 - 78)  BP: 142/64 (01-03-24 @ 14:21) (109/61 - 154/61)  RR: 18 (01-03-24 @ 14:21) (10 - 23)  SpO2: 99% (01-03-24 @ 14:21) (95% - 99%)  Wt(kg): --  CAPILLARY BLOOD GLUCOSE          PHYSICAL EXAM:  General: NAD  Neuro:  Alert & oriented x 3  HEENT: NCAT, EOMI, conjunctiva clear  CV: +S1+S2 regular rate and rhythm  Lung:respirations nonlabored, good inspiratory effort  Abdomen: soft, nondistended. laparoscopic incisions with pink foam dressings C/D/I  Extremities: no pedal edema or calf tenderness noted     LABS:                        11.1   9.55  )-----------( 390      ( 03 Jan 2024 05:30 )             34.1     01-03    134<L>  |  104  |  13  ----------------------------<  155<H>  4.3   |  25  |  0.57    Ca    9.1      03 Jan 2024 05:30  Phos  3.9     01-03  Mg     2.1     01-03    TPro  7.4  /  Alb  2.5<L>  /  TBili  0.3  /  DBili  x   /  AST  30  /  ALT  54  /  AlkPhos  253<H>  01-02    PT/INR - ( 02 Jan 2024 06:15 )   PT: 11.3 sec;   INR: 0.95 ratio         PTT - ( 02 Jan 2024 06:15 )  PTT:31.4 sec    I&O:         A/P: 77 yo F here with gallstone pancreatitis, POD0 s/p laparoscopic cholecystectomy   -multimodal pain control   -Increase activity with OOB, Ambulate  -c/w proper incentive spirometry use  -pt cleared for d/c form surgical standpoint, discussed with Dr Byron Noonan    Follow up with Dr. Byron Noonan in 1-2 weeks. Please have pt call [phone] to schedule an appointment.   CALL YOUR SURGEON at above number IF: You have any bleeding that does not stop, any pus draining from your wound, any fever (over 100.4 F) or chills, persistent nausea/vomiting, persistent diarrhea, or if your pain is not controlled on your discharge pain medications. No heavy lifting until cleared by surgeon.  Wound: You may take off outer pink dressing and shower after 48 hours. After showering, pat steri strips dry. Leave the white steri strips in place, they will fall off on their own in approximately 5-7 days or they will be removed at your follow up appointment.

## 2024-01-04 ENCOUNTER — TRANSCRIPTION ENCOUNTER (OUTPATIENT)
Age: 79
End: 2024-01-04

## 2024-01-04 VITALS
HEART RATE: 69 BPM | OXYGEN SATURATION: 98 % | DIASTOLIC BLOOD PRESSURE: 66 MMHG | SYSTOLIC BLOOD PRESSURE: 148 MMHG | RESPIRATION RATE: 17 BRPM | TEMPERATURE: 97 F

## 2024-01-04 LAB
ALBUMIN SERPL ELPH-MCNC: 2.5 G/DL — LOW (ref 3.3–5)
ALBUMIN SERPL ELPH-MCNC: 2.5 G/DL — LOW (ref 3.3–5)
ALP SERPL-CCNC: 224 U/L — HIGH (ref 40–120)
ALP SERPL-CCNC: 224 U/L — HIGH (ref 40–120)
ALT FLD-CCNC: 43 U/L — SIGNIFICANT CHANGE UP (ref 12–78)
ALT FLD-CCNC: 43 U/L — SIGNIFICANT CHANGE UP (ref 12–78)
ANION GAP SERPL CALC-SCNC: 6 MMOL/L — SIGNIFICANT CHANGE UP (ref 5–17)
ANION GAP SERPL CALC-SCNC: 6 MMOL/L — SIGNIFICANT CHANGE UP (ref 5–17)
AST SERPL-CCNC: <3 U/L — LOW (ref 15–37)
AST SERPL-CCNC: <3 U/L — LOW (ref 15–37)
BILIRUB DIRECT SERPL-MCNC: 0.1 MG/DL — SIGNIFICANT CHANGE UP (ref 0–0.3)
BILIRUB DIRECT SERPL-MCNC: 0.1 MG/DL — SIGNIFICANT CHANGE UP (ref 0–0.3)
BILIRUB INDIRECT FLD-MCNC: 0.1 MG/DL — LOW (ref 0.2–1)
BILIRUB INDIRECT FLD-MCNC: 0.1 MG/DL — LOW (ref 0.2–1)
BILIRUB SERPL-MCNC: 0.2 MG/DL — SIGNIFICANT CHANGE UP (ref 0.2–1.2)
BILIRUB SERPL-MCNC: 0.2 MG/DL — SIGNIFICANT CHANGE UP (ref 0.2–1.2)
BUN SERPL-MCNC: 18 MG/DL — SIGNIFICANT CHANGE UP (ref 7–23)
BUN SERPL-MCNC: 18 MG/DL — SIGNIFICANT CHANGE UP (ref 7–23)
CALCIUM SERPL-MCNC: 8.8 MG/DL — SIGNIFICANT CHANGE UP (ref 8.5–10.1)
CALCIUM SERPL-MCNC: 8.8 MG/DL — SIGNIFICANT CHANGE UP (ref 8.5–10.1)
CHLORIDE SERPL-SCNC: 104 MMOL/L — SIGNIFICANT CHANGE UP (ref 96–108)
CHLORIDE SERPL-SCNC: 104 MMOL/L — SIGNIFICANT CHANGE UP (ref 96–108)
CO2 SERPL-SCNC: 25 MMOL/L — SIGNIFICANT CHANGE UP (ref 22–31)
CO2 SERPL-SCNC: 25 MMOL/L — SIGNIFICANT CHANGE UP (ref 22–31)
CREAT SERPL-MCNC: 0.83 MG/DL — SIGNIFICANT CHANGE UP (ref 0.5–1.3)
CREAT SERPL-MCNC: 0.83 MG/DL — SIGNIFICANT CHANGE UP (ref 0.5–1.3)
EGFR: 73 ML/MIN/1.73M2 — SIGNIFICANT CHANGE UP
EGFR: 73 ML/MIN/1.73M2 — SIGNIFICANT CHANGE UP
GLUCOSE SERPL-MCNC: 259 MG/DL — HIGH (ref 70–99)
GLUCOSE SERPL-MCNC: 259 MG/DL — HIGH (ref 70–99)
HCT VFR BLD CALC: 32.6 % — LOW (ref 34.5–45)
HCT VFR BLD CALC: 32.6 % — LOW (ref 34.5–45)
HGB BLD-MCNC: 10.6 G/DL — LOW (ref 11.5–15.5)
HGB BLD-MCNC: 10.6 G/DL — LOW (ref 11.5–15.5)
MCHC RBC-ENTMCNC: 29.4 PG — SIGNIFICANT CHANGE UP (ref 27–34)
MCHC RBC-ENTMCNC: 29.4 PG — SIGNIFICANT CHANGE UP (ref 27–34)
MCHC RBC-ENTMCNC: 32.5 G/DL — SIGNIFICANT CHANGE UP (ref 32–36)
MCHC RBC-ENTMCNC: 32.5 G/DL — SIGNIFICANT CHANGE UP (ref 32–36)
MCV RBC AUTO: 90.6 FL — SIGNIFICANT CHANGE UP (ref 80–100)
MCV RBC AUTO: 90.6 FL — SIGNIFICANT CHANGE UP (ref 80–100)
NRBC # BLD: 0 /100 WBCS — SIGNIFICANT CHANGE UP (ref 0–0)
NRBC # BLD: 0 /100 WBCS — SIGNIFICANT CHANGE UP (ref 0–0)
PLATELET # BLD AUTO: 454 K/UL — HIGH (ref 150–400)
PLATELET # BLD AUTO: 454 K/UL — HIGH (ref 150–400)
POTASSIUM SERPL-MCNC: 4.3 MMOL/L — SIGNIFICANT CHANGE UP (ref 3.5–5.3)
POTASSIUM SERPL-MCNC: 4.3 MMOL/L — SIGNIFICANT CHANGE UP (ref 3.5–5.3)
POTASSIUM SERPL-SCNC: 4.3 MMOL/L — SIGNIFICANT CHANGE UP (ref 3.5–5.3)
POTASSIUM SERPL-SCNC: 4.3 MMOL/L — SIGNIFICANT CHANGE UP (ref 3.5–5.3)
PROT SERPL-MCNC: 7.2 GM/DL — SIGNIFICANT CHANGE UP (ref 6–8.3)
PROT SERPL-MCNC: 7.2 GM/DL — SIGNIFICANT CHANGE UP (ref 6–8.3)
RBC # BLD: 3.6 M/UL — LOW (ref 3.8–5.2)
RBC # BLD: 3.6 M/UL — LOW (ref 3.8–5.2)
RBC # FLD: 12.7 % — SIGNIFICANT CHANGE UP (ref 10.3–14.5)
RBC # FLD: 12.7 % — SIGNIFICANT CHANGE UP (ref 10.3–14.5)
SODIUM SERPL-SCNC: 135 MMOL/L — SIGNIFICANT CHANGE UP (ref 135–145)
SODIUM SERPL-SCNC: 135 MMOL/L — SIGNIFICANT CHANGE UP (ref 135–145)
SURGICAL PATHOLOGY STUDY: SIGNIFICANT CHANGE UP
SURGICAL PATHOLOGY STUDY: SIGNIFICANT CHANGE UP
WBC # BLD: 13.16 K/UL — HIGH (ref 3.8–10.5)
WBC # BLD: 13.16 K/UL — HIGH (ref 3.8–10.5)
WBC # FLD AUTO: 13.16 K/UL — HIGH (ref 3.8–10.5)
WBC # FLD AUTO: 13.16 K/UL — HIGH (ref 3.8–10.5)

## 2024-01-04 PROCEDURE — 99239 HOSP IP/OBS DSCHRG MGMT >30: CPT

## 2024-01-04 RX ORDER — ACETAMINOPHEN 500 MG
2 TABLET ORAL
Qty: 0 | Refills: 0 | DISCHARGE
Start: 2024-01-04

## 2024-01-04 RX ORDER — POLYETHYLENE GLYCOL 3350 17 G/17G
17 POWDER, FOR SOLUTION ORAL
Qty: 1 | Refills: 0
Start: 2024-01-04 | End: 2024-01-13

## 2024-01-04 RX ORDER — LACTULOSE 10 G/15ML
10 SOLUTION ORAL ONCE
Refills: 0 | Status: DISCONTINUED | OUTPATIENT
Start: 2024-01-04 | End: 2024-01-04

## 2024-01-04 RX ORDER — OXYCODONE HYDROCHLORIDE 5 MG/1
1 TABLET ORAL
Qty: 12 | Refills: 0
Start: 2024-01-04 | End: 2024-01-06

## 2024-01-04 RX ADMIN — OXYCODONE HYDROCHLORIDE 5 MILLIGRAM(S): 5 TABLET ORAL at 10:37

## 2024-01-04 RX ADMIN — HEPARIN SODIUM 5000 UNIT(S): 5000 INJECTION INTRAVENOUS; SUBCUTANEOUS at 14:19

## 2024-01-04 RX ADMIN — OXYCODONE HYDROCHLORIDE 5 MILLIGRAM(S): 5 TABLET ORAL at 09:10

## 2024-01-04 RX ADMIN — Medication 650 MILLIGRAM(S): at 05:45

## 2024-01-04 RX ADMIN — HEPARIN SODIUM 5000 UNIT(S): 5000 INJECTION INTRAVENOUS; SUBCUTANEOUS at 05:44

## 2024-01-04 RX ADMIN — Medication 650 MILLIGRAM(S): at 06:45

## 2024-01-04 NOTE — DISCHARGE NOTE NURSING/CASE MANAGEMENT/SOCIAL WORK - NSDCFUADDAPPT_GEN_ALL_CORE_FT
Follow up with Dr. Byron Noonan in 1-2 weeks. Please have pt call [phone] to schedule an appointment.   CALL YOUR SURGEON at above number IF: You have any bleeding that does not stop, any pus draining from your wound, any fever (over 100.4 F) or chills, persistent nausea/vomiting, persistent diarrhea, or if your pain is not controlled on your discharge pain medications. No heavy lifting until cleared by surgeon.  Wound: You may take off outer pink dressing and shower after 48 hours. After showering, pat steri strips dry. Leave the white steri strips in place, they will fall off on their own in approximately 5-7 days or they will be removed at your follow up appointment.    Follow up with Dr. Byron Noonan in 1-2 weeks. Please have pt call [phone] to schedule an appointment.   CALL YOUR SURGEON at above number IF: You have any bleeding that does not stop, any pus draining from your wound, any fever (over 100.4 F) or chills, persistent nausea/vomiting, persistent diarrhea, or if your pain is not controlled on your discharge pain medications. No heavy lifting until cleared by surgeon.  Wound: You may take off outer pink dressing and shower after 48 hours. After showering, pat steri strips dry. Leave the white steri strips in place, they will fall off on their own in approximately 5-7 days or they will be removed at your follow up appointment.

## 2024-01-04 NOTE — MEDICAL STUDENT PROGRESS NOTE(EDUCATION) - PLAN 1
-patient is comfortable and doing well  -pain is currently well controlled with acetaminophen  -will discuss case with Dr Byron Noonan to assess clearance for discharge

## 2024-01-04 NOTE — PROGRESS NOTE ADULT - SUBJECTIVE AND OBJECTIVE BOX
78F w/ hx of HTN, DM p/w abdominal pain. Sx began several days prior but progressively worsening.  Admitted to [facility] surg for acute gallstone pancreatitis.      Patient seen and examined at the bedside    MEDICATIONS  (STANDING):  heparin   Injectable 5000 Unit(s) SubCutaneous every 8 hours  influenza  Vaccine (HIGH DOSE) 0.7 milliLiter(s) IntraMuscular once    MEDICATIONS  (PRN):  acetaminophen     Tablet .. 650 milliGRAM(s) Oral every 6 hours PRN Temp greater or equal to 38C (100.4F), Mild Pain (1 - 3)  albuterol    90 MICROgram(s) HFA Inhaler 2 Puff(s) Inhalation every 6 hours PRN Shortness of Breath and/or Wheezing  aluminum hydroxide/magnesium hydroxide/simethicone Suspension 30 milliLiter(s) Oral every 4 hours PRN Dyspepsia  guaiFENesin Oral Liquid (Sugar-Free) 100 milliGRAM(s) Oral every 6 hours PRN Cough  melatonin 3 milliGRAM(s) Oral at bedtime PRN Insomnia  ondansetron Injectable 4 milliGRAM(s) IV Push every 8 hours PRN Nausea and/or Vomiting  oxyCODONE    IR 2.5 milliGRAM(s) Oral every 6 hours PRN Moderate Pain (4 - 6)  oxyCODONE    IR 5 milliGRAM(s) Oral every 6 hours PRN Severe Pain (7 - 10)        Vital Signs Last 24 Hrs  T(C): 36.5 (04 Jan 2024 06:20), Max: 37.1 (03 Jan 2024 11:20)  T(F): 97.7 (04 Jan 2024 06:20), Max: 98.8 (03 Jan 2024 11:20)  HR: 63 (04 Jan 2024 06:20) (63 - 78)  BP: 135/64 (04 Jan 2024 06:20) (109/61 - 154/61)  BP(mean): --  RR: 18 (04 Jan 2024 06:20) (10 - 23)  SpO2: 100% (04 Jan 2024 06:20) (96% - 100%)    Parameters below as of 04 Jan 2024 06:20  Patient On (Oxygen Delivery Method): room air      GENERAL: NAD, lying in bed  HEAD:  Atraumatic, Normocephalic, MMM  CHEST/LUNG: No use of accessory muscles, CTAB, breathing non-labored  COR: RR, no mrcg  ABD: Soft, ND/NT, +BS  PSYCH: AAOx3  NEUROLOGY: CN II-XII grossly intact, moving all extremities  SKIN: No rashes or lesions  EXT: wwp, no cce    LABS:                        10.6   13.16 )-----------( 454      ( 04 Jan 2024 05:40 )             32.6   01-04    135  |  104  |  18  ----------------------------<  259<H>  4.3   |  25  |  0.83    Ca    8.8      04 Jan 2024 05:40  Phos  3.9     01-03  Mg     2.1     01-03    RADIOLOGY & ADDITIONAL TESTS:     78F w/ hx of HTN, DM p/w abdominal pain. Sx began several days prior but progressively worsening.  Admitted to [facility] surg for acute gallstone pancreatitis.      Patient seen and examined at the bedside    MEDICATIONS  (STANDING):  heparin   Injectable 5000 Unit(s) SubCutaneous every 8 hours  influenza  Vaccine (HIGH DOSE) 0.7 milliLiter(s) IntraMuscular once    MEDICATIONS  (PRN):  acetaminophen     Tablet .. 650 milliGRAM(s) Oral every 6 hours PRN Temp greater or equal to 38C (100.4F), Mild Pain (1 - 3)  albuterol    90 MICROgram(s) HFA Inhaler 2 Puff(s) Inhalation every 6 hours PRN Shortness of Breath and/or Wheezing  aluminum hydroxide/magnesium hydroxide/simethicone Suspension 30 milliLiter(s) Oral every 4 hours PRN Dyspepsia  guaiFENesin Oral Liquid (Sugar-Free) 100 milliGRAM(s) Oral every 6 hours PRN Cough  melatonin 3 milliGRAM(s) Oral at bedtime PRN Insomnia  ondansetron Injectable 4 milliGRAM(s) IV Push every 8 hours PRN Nausea and/or Vomiting  oxyCODONE    IR 2.5 milliGRAM(s) Oral every 6 hours PRN Moderate Pain (4 - 6)  oxyCODONE    IR 5 milliGRAM(s) Oral every 6 hours PRN Severe Pain (7 - 10)        Vital Signs Last 24 Hrs  T(C): 36.5 (04 Jan 2024 06:20), Max: 37.1 (03 Jan 2024 11:20)  T(F): 97.7 (04 Jan 2024 06:20), Max: 98.8 (03 Jan 2024 11:20)  HR: 63 (04 Jan 2024 06:20) (63 - 78)  BP: 135/64 (04 Jan 2024 06:20) (109/61 - 154/61)  BP(mean): --  RR: 18 (04 Jan 2024 06:20) (10 - 23)  SpO2: 100% (04 Jan 2024 06:20) (96% - 100%)    Parameters below as of 04 Jan 2024 06:20  Patient On (Oxygen Delivery Method): room air      GENERAL: NAD, lying in bed  HEAD:  Atraumatic, Normocephalic, MMM  CHEST/LUNG: No use of accessory muscles, CTAB, breathing non-labored  COR: RR, no mrcg  ABD: Soft, ND/NT, +BS  PSYCH: AAOx3  NEUROLOGY: CN II-XII grossly intact, moving all extremities  SKIN: No rashes or lesions  EXT: wwp, no cce    LABS:                        10.6   13.16 )-----------( 454      ( 04 Jan 2024 05:40 )             32.6   01-04    135  |  104  |  18  ----------------------------<  259<H>  4.3   |  25  |  0.83    Ca    8.8      04 Jan 2024 05:40  Phos  3.9     01-03  Mg     2.1     01-03    RADIOLOGY & ADDITIONAL TESTS:

## 2024-01-04 NOTE — MEDICAL STUDENT PROGRESS NOTE(EDUCATION) - SUBJECTIVE AND OBJECTIVE BOX
*******************************  Dallin Law  Medical Student   Contact via [employer] TEAMS    *******************************    PROGRESS NOTE:     Kimberly Sam is a 78 year old female POD #1 following cholecystectomy for gallstone pancreatitis.       INTERVAL EVENTS: No acute overnight events.     SUBJECTIVE: Patient seen and examined at bedside. This morning, the patient is comfortable and doing well. She reports right sided subcostal pain that she rates a 5/10. The pain made it difficult for her to sleep last night.  Denies fevers, chills, N/V/D, chest pain, SOB, abdominal pain. She reports that she passed flatus this morning but has not made stool yet. She endorses urination. She indicated that she has been able to eat without issue and has a strong appetite.     MEDICATIONS  (STANDING):  heparin   Injectable 5000 Unit(s) SubCutaneous every 8 hours  influenza  Vaccine (HIGH DOSE) 0.7 milliLiter(s) IntraMuscular once    MEDICATIONS  (PRN):  acetaminophen     Tablet .. 650 milliGRAM(s) Oral every 6 hours PRN Temp greater or equal to 38C (100.4F), Mild Pain (1 - 3)  albuterol    90 MICROgram(s) HFA Inhaler 2 Puff(s) Inhalation every 6 hours PRN Shortness of Breath and/or Wheezing  aluminum hydroxide/magnesium hydroxide/simethicone Suspension 30 milliLiter(s) Oral every 4 hours PRN Dyspepsia  guaiFENesin Oral Liquid (Sugar-Free) 100 milliGRAM(s) Oral every 6 hours PRN Cough  melatonin 3 milliGRAM(s) Oral at bedtime PRN Insomnia  ondansetron Injectable 4 milliGRAM(s) IV Push every 8 hours PRN Nausea and/or Vomiting  oxyCODONE    IR 5 milliGRAM(s) Oral every 6 hours PRN Severe Pain (7 - 10)  oxyCODONE    IR 2.5 milliGRAM(s) Oral every 6 hours PRN Moderate Pain (4 - 6)      CAPILLARY BLOOD GLUCOSE        I&O's Summary    03 Jan 2024 07:01  -  04 Jan 2024 07:00  --------------------------------------------------------  IN: 0 mL / OUT: 500 mL / NET: -500 mL        PHYSICAL EXAM:  Vital Signs Last 24 Hrs  T(C): 36.5 (04 Jan 2024 06:20), Max: 37.1 (03 Jan 2024 11:20)  T(F): 97.7 (04 Jan 2024 06:20), Max: 98.8 (03 Jan 2024 11:20)  HR: 63 (04 Jan 2024 06:20) (63 - 78)  BP: 135/64 (04 Jan 2024 06:20) (109/61 - 154/61)  BP(mean): --  RR: 18 (04 Jan 2024 06:20) (10 - 23)  SpO2: 100% (04 Jan 2024 06:20) (96% - 100%)    Parameters below as of 04 Jan 2024 06:20  Patient On (Oxygen Delivery Method): room air        GENERAL: NAD, lying in bed comfortably  HEAD: Atraumatic, normocephalic  EYES: EOMI, PERRLA, conjunctiva and sclera clear  ENT: Moist mucous membranes  NECK: Supple, no JVD  HEART: S1, S2, Regular rate and rhythm, no murmurs, rubs, or gallops  LUNGS: Unlabored respirations, clear to auscultation bilaterally, no crackles, wheezing, or rhonchi  ABDOMEN: Soft, nontender, nondistended, +BS, dressings are clean, dry, and intact   EXTREMITIES: 2+ peripheral pulses bilaterally. No clubbing, cyanosis, or edema  NERVOUS SYSTEM:  A&Ox3, no focal deficits   SKIN: No rashes or lesions    LABS:                        10.6   13.16 )-----------( 454      ( 04 Jan 2024 05:40 )             32.6     01-04    135  |  104  |  18  ----------------------------<  259<H>  4.3   |  25  |  0.83    Ca    8.8      04 Jan 2024 05:40  Phos  3.9     01-03  Mg     2.1     01-03            Urinalysis Basic - ( 04 Jan 2024 05:40 )    Color: x / Appearance: x / SG: x / pH: x  Gluc: 259 mg/dL / Ketone: x  / Bili: x / Urobili: x   Blood: x / Protein: x / Nitrite: x   Leuk Esterase: x / RBC: x / WBC x   Sq Epi: x / Non Sq Epi: x / Bacteria: x          RADIOLOGY & ADDITIONAL TESTS:  Results Reviewed:   Imaging Personally Reviewed:  Electrocardiogram Personally Reviewed:  Tele: *******************************  Dallin Law  Medical Student   Contact via [employer] TEAMS    *******************************    PROGRESS NOTE:     Kimberly Sam is a 78 year old female POD #1 following laparoscopic cholecystectomy for gallstone pancreatitis.       INTERVAL EVENTS: No acute overnight events.     SUBJECTIVE: Patient seen and examined at bedside. This morning, the patient is comfortable and doing well. She reports right sided subcostal pain that she rates a 5/10. The pain made it difficult for her to sleep last night.  Denies fevers, chills, N/V/D, chest pain, SOB, abdominal pain. She reports that she passed flatus this morning but has not made stool yet. She endorses urination. She indicated that she has been able to eat without issue and has a strong appetite.     MEDICATIONS  (STANDING):  heparin   Injectable 5000 Unit(s) SubCutaneous every 8 hours  influenza  Vaccine (HIGH DOSE) 0.7 milliLiter(s) IntraMuscular once    MEDICATIONS  (PRN):  acetaminophen     Tablet .. 650 milliGRAM(s) Oral every 6 hours PRN Temp greater or equal to 38C (100.4F), Mild Pain (1 - 3)  albuterol    90 MICROgram(s) HFA Inhaler 2 Puff(s) Inhalation every 6 hours PRN Shortness of Breath and/or Wheezing  aluminum hydroxide/magnesium hydroxide/simethicone Suspension 30 milliLiter(s) Oral every 4 hours PRN Dyspepsia  guaiFENesin Oral Liquid (Sugar-Free) 100 milliGRAM(s) Oral every 6 hours PRN Cough  melatonin 3 milliGRAM(s) Oral at bedtime PRN Insomnia  ondansetron Injectable 4 milliGRAM(s) IV Push every 8 hours PRN Nausea and/or Vomiting  oxyCODONE    IR 5 milliGRAM(s) Oral every 6 hours PRN Severe Pain (7 - 10)  oxyCODONE    IR 2.5 milliGRAM(s) Oral every 6 hours PRN Moderate Pain (4 - 6)      CAPILLARY BLOOD GLUCOSE        I&O's Summary    03 Jan 2024 07:01  -  04 Jan 2024 07:00  --------------------------------------------------------  IN: 0 mL / OUT: 500 mL / NET: -500 mL        PHYSICAL EXAM:  Vital Signs Last 24 Hrs  T(C): 36.5 (04 Jan 2024 06:20), Max: 37.1 (03 Jan 2024 11:20)  T(F): 97.7 (04 Jan 2024 06:20), Max: 98.8 (03 Jan 2024 11:20)  HR: 63 (04 Jan 2024 06:20) (63 - 78)  BP: 135/64 (04 Jan 2024 06:20) (109/61 - 154/61)  BP(mean): --  RR: 18 (04 Jan 2024 06:20) (10 - 23)  SpO2: 100% (04 Jan 2024 06:20) (96% - 100%)    Parameters below as of 04 Jan 2024 06:20  Patient On (Oxygen Delivery Method): room air        GENERAL: NAD, lying in bed comfortably  HEAD: Atraumatic, normocephalic  EYES: EOMI, PERRLA, conjunctiva and sclera clear  ENT: Moist mucous membranes  NECK: Supple, no JVD  HEART: S1, S2, Regular rate and rhythm, no murmurs, rubs, or gallops  LUNGS: Unlabored respirations, clear to auscultation bilaterally, no crackles, wheezing, or rhonchi  ABDOMEN: Soft, nontender, nondistended, +BS, dressings are clean, dry, and intact   EXTREMITIES: 2+ peripheral pulses bilaterally. No clubbing, cyanosis, or edema  NERVOUS SYSTEM:  A&Ox3, no focal deficits   SKIN: No rashes or lesions    LABS:                        10.6   13.16 )-----------( 454      ( 04 Jan 2024 05:40 )             32.6     01-04    135  |  104  |  18  ----------------------------<  259<H>  4.3   |  25  |  0.83    Ca    8.8      04 Jan 2024 05:40  Phos  3.9     01-03  Mg     2.1     01-03            Urinalysis Basic - ( 04 Jan 2024 05:40 )    Color: x / Appearance: x / SG: x / pH: x  Gluc: 259 mg/dL / Ketone: x  / Bili: x / Urobili: x   Blood: x / Protein: x / Nitrite: x   Leuk Esterase: x / RBC: x / WBC x   Sq Epi: x / Non Sq Epi: x / Bacteria: x          RADIOLOGY & ADDITIONAL TESTS:  Results Reviewed:   Imaging Personally Reviewed:  Electrocardiogram Personally Reviewed:  Tele:

## 2024-01-04 NOTE — DISCHARGE NOTE NURSING/CASE MANAGEMENT/SOCIAL WORK - PATIENT PORTAL LINK FT
You can access the FollowMyHealth Patient Portal offered by [facility] by registering at the following website: [email or web address]. By joining [employer]’s FollowMyHealth portal, you will also be able to view your health information using other applications (apps) compatible with our system. You can access the FollowMyHealth Patient Portal offered by [facility] by registering at the following website: [email or web address]. By joining [employer]’s FollowMyHealth portal, you will also be able to view your health information using other applications (apps) compatible with our system.

## 2024-01-04 NOTE — PROGRESS NOTE ADULT - PROVIDER SPECIALTY LIST ADULT
Hospitalist
Surgery
Hospitalist
Surgery
Surgery
Hospitalist
Surgery
Hospitalist
Surgery
Hospitalist

## 2024-01-04 NOTE — DISCHARGE NOTE PROVIDER - NSDCFUADDAPPT_GEN_ALL_CORE_FT
Follow up with Dr. Byron Noonan in 1-2 weeks. Please have pt call [phone] to schedule an appointment.   CALL YOUR SURGEON at above number IF: You have any bleeding that does not stop, any pus draining from your wound, any fever (over 100.4 F) or chills, persistent nausea/vomiting, persistent diarrhea, or if your pain is not controlled on your discharge pain medications. No heavy lifting until cleared by surgeon.  Wound: You may take off outer pink dressing and shower after 48 hours. After showering, pat steri strips dry. Leave the white steri strips in place, they will fall off on their own in approximately 5-7 days or they will be removed at your follow up appointment.    Follow up with Dr. Byron Noonan in 1-2 weeks. Please have pt call [phone] to schedule an appointment.   CALL YOUR SURGEON at above number IF: You have any bleeding that does not stop, any pus draining from your wound, any fever (over 100.4 F) or chills, persistent nausea/vomiting, persistent diarrhea, or if your pain is not controlled on your discharge pain medications. No heavy lifting until cleared by surgeon.  Wound: You may take off outer pink dressing and shower after 48 hours. After showering, pat steri strips dry. Leave the white steri strips in place, they will fall off on their own in approximately 5-7 days or they will be removed at your follow up appointment.

## 2024-01-04 NOTE — PROGRESS NOTE ADULT - ASSESSMENT
78F w/ hx of HTN, DM p/w abdominal pain. Sx began several days prior but progressively worsening.  Admitted to med surg for acute gallstone pancreatitis.    1. Acute gallstone pancreatitis:  - Pain improved, on regular diet.    - s/p lap clair day 1.  Cleared from surgical standpoint mild leucocytosis presumed reactive,  afebrile     2. HTN: stable  - Well controlled    3. UTI:  - s/p IV ceftriaxone x3 days

## 2024-01-04 NOTE — DISCHARGE NOTE NURSING/CASE MANAGEMENT/SOCIAL WORK - NSDCPEFALRISK_GEN_ALL_CORE
For information on Fall & Injury Prevention, visit: [email or web address] OR  [email or web address] OR  https://www.cdc.gov/steadi/patient.html For information on Fall & Injury Prevention, visit: [email or web address] OR  [email or web address] OR  https://www.cdc.gov/steadi/patient.html

## 2024-01-04 NOTE — PROGRESS NOTE ADULT - NUTRITIONAL ASSESSMENT
This patient has been assessed with a concern for Malnutrition and has been determined to have a diagnosis/diagnoses of Moderate protein-calorie malnutrition.    This patient is being managed with:   Diet Regular-  Entered: Dec 31 2023 10:52AM    The following pending diet order is being considered for treatment of Moderate protein-calorie malnutrition:  Diet Consistent Carbohydrate w/Evening Snack-  Low Fat (LOWFAT)  Supplement Feeding Modality:  Oral  Glucerna Shake Cans or Servings Per Day:  1       Frequency:  Daily  Entered: Jan 2 2024 11:36AM  
This patient has been assessed with a concern for Malnutrition and has been determined to have a diagnosis/diagnoses of Moderate protein-calorie malnutrition.    This patient is being managed with:   Diet Consistent Carbohydrate w/Evening Snack-  Low Fat (LOWFAT)  Supplement Feeding Modality:  Oral  Glucerna Shake Cans or Servings Per Day:  1       Frequency:  Daily  Entered: Jan 2 2024 11:36AM  
This patient has been assessed with a concern for Malnutrition and has been determined to have a diagnosis/diagnoses of Moderate protein-calorie malnutrition.    This patient is being managed with:   Diet NPO after Midnight-     NPO Start Date: 01-Jan-2024   NPO Start Time: 23:59  Entered: Jan 1 2024  6:07PM    Diet Regular-  Entered: Dec 31 2023 10:52AM    The following pending diet order is being considered for treatment of Moderate protein-calorie malnutrition:  Diet Consistent Carbohydrate w/Evening Snack-  Low Fat (LOWFAT)  Supplement Feeding Modality:  Oral  Glucerna Shake Cans or Servings Per Day:  1       Frequency:  Daily  Entered: Jan 2 2024 11:36AM

## 2024-01-04 NOTE — DISCHARGE NOTE PROVIDER - INSTRUCTIONS
Diet, Consistent Carbohydrate w/Evening Snack:   Low Fat (LOWFAT)  Supplement Feeding Modality:  Oral  Glucerna Shake Cans or Servings Per Day:  1       Frequency:  Daily (01-02-24 @ 11:36) [Active]

## 2024-01-04 NOTE — PROGRESS NOTE ADULT - ASSESSMENT
77 yo F here with gallstone pancreatitis, POD1 s/p laparoscopic cholecystectomy   -tolerating diet, +flatus  -pain well controlled  -pt cleared for d/c form surgical standpoint, discussed with Dr yBron Noonan 77 yo F here with gallstone pancreatitis, POD1 s/p laparoscopic cholecystectomy   -tolerating diet, +flatus  -pain well controlled  -pt cleared for d/c form surgical standpoint, discussed with Dr Byron Noonan

## 2024-01-04 NOTE — PROGRESS NOTE ADULT - SUBJECTIVE AND OBJECTIVE BOX
SURGERY PROGRESS HPI:  Pt seen and examined at bedside. Pt denies complaints. No acute events overnight. Pt tolerating diet. Pt denies nausea and vomiting.  +flatus.    Vital Signs Last 24 Hrs  T(C): 36.5 (04 Jan 2024 10:38), Max: 37.1 (03 Jan 2024 18:20)  T(F): 97.7 (04 Jan 2024 10:38), Max: 98.7 (03 Jan 2024 18:20)  HR: 68 (04 Jan 2024 10:38) (63 - 78)  BP: 126/63 (04 Jan 2024 10:38) (109/61 - 149/62)  BP(mean): --  RR: 16 (04 Jan 2024 10:38) (16 - 18)  SpO2: 97% (04 Jan 2024 10:38) (97% - 100%)    Parameters below as of 04 Jan 2024 10:38  Patient On (Oxygen Delivery Method): room air          PHYSICAL EXAM:    GENERAL: NAD  HEAD:  Atraumatic, Normocephalic  CHEST/LUNG: Normal work of breathing  HEART: RRR   ABDOMEN: non distended, soft, appropriately tender, no guarding, steri strips CDI  EXTREMITIES:  calf soft, non tender b/l

## 2024-01-04 NOTE — DISCHARGE NOTE PROVIDER - NSDCMRMEDTOKEN_GEN_ALL_CORE_FT
acetaminophen 325 mg oral tablet: 2 tab(s) orally every 6 hours As needed Temp greater or equal to 38C (100.4F), Mild Pain (1 - 3)  oxyCODONE 5 mg oral tablet: 1 tab(s) orally every 6 hours as needed for Severe Pain (7 - 10) MDD: 4 tablets   acetaminophen 325 mg oral tablet: 2 tab(s) orally every 6 hours As needed Temp greater or equal to 38C (100.4F), Mild Pain (1 - 3)  MiraLax oral powder for reconstitution: 17 gram(s) orally once a day as needed for  constipation  oxyCODONE 5 mg oral tablet: 1 tab(s) orally every 6 hours as needed for Severe Pain (7 - 10) MDD: 4 tablets

## 2024-01-04 NOTE — DISCHARGE NOTE PROVIDER - CARE PROVIDER_API CALL
Byron Noonan  Surgery  [address] [facility], [address]  [place] [id number]  Phone: [phone]  Fax: [phone]  Follow Up Time: 1 week   Byron Noonan  Surgery  [address] [facility]  [place] [id number]  Phone: [phone]  Fax: [phone]  Follow Up Time: 1 week

## 2024-01-04 NOTE — DISCHARGE NOTE PROVIDER - NSDCCPCAREPLAN_GEN_ALL_CORE_FT
PRINCIPAL DISCHARGE DIAGNOSIS  Diagnosis: Pancreatitis  Assessment and Plan of Treatment: due to gall stones s/p cholecystectomy      SECONDARY DISCHARGE DIAGNOSES  Diagnosis: Gallstone pancreatitis  Assessment and Plan of Treatment:      PRINCIPAL DISCHARGE DIAGNOSIS  Diagnosis: Pancreatitis  Assessment and Plan of Treatment: due to gall stones s/p cholecystectomy. take tylenol/oxycodone as needed. take miralax as needed for constipation as opioids can cause constipation      SECONDARY DISCHARGE DIAGNOSES  Diagnosis: Gallstone pancreatitis  Assessment and Plan of Treatment:

## 2024-01-04 NOTE — DISCHARGE NOTE PROVIDER - PROVIDER TOKENS
PROVIDER:[TOKEN:[[id number]],FOLLOWUP:[1 week]] PROVIDER:[TOKEN:[[id number]],FOLLOWUP:[1 week]]

## 2024-01-04 NOTE — DISCHARGE NOTE PROVIDER - HOSPITAL COURSE
78F w/ hx of HTN, DM p/w abdominal pain. Sx began several days prior but progressively worsening.  Admitted to med surg for acute gallstone pancreatitis.    1. Acute gallstone pancreatitis:  - Pain improved, on regular diet.    - s/p lap choleCleared from surgical standpoint mild leucocytosis presumed reactive,  afebrile   Follow up with Dr. Byron Noonan in 1-2 weeks. Please have pt call [phone] to schedule an appointment.   CALL YOUR SURGEON at above number IF: You have any bleeding that does not stop, any pus draining from your wound, any fever (over 100.4 F) or chills, persistent nausea/vomiting, persistent diarrhea, or if your pain is not controlled on your discharge pain medications. No heavy lifting until cleared by surgeon.  Wound: You may take off outer pink dressing and shower after 48 hours. After showering, pat steri strips dry. Leave the white steri strips in place, they will fall off on their own in approximately 5-7 days or they will be removed at your follow up appointment.       2. HTN: stable  - Well controlled    3. UTI:  - s/p IV ceftriaxone x3 days    Patient cleared for discharge home with home PT with follow up with surgeon in 1 week    Diagnosis:   acute pancreatitis secondary to gall stones  hypertension  diabetes mellitus  acute abdominal pain  postoperative leukocytosis  acute blood loss anemia secondary to surgery and hemodilution  thrombocytosis  hyponatremia  hyperglycemia  hypokalemia  transaminitis  hypoalbuminemia  hypertriglyceridemia  hyperlactatemia  acute urinary tract infection    78F w/ hx of HTN, DM p/w abdominal pain. Sx began several days prior but progressively worsening.  Admitted to med surg for acute gallstone pancreatitis.    1. Acute gallstone pancreatitis:  - Pain improved, on regular diet.    - s/p lap choleCleared from surgical standpoint mild leucocytosis presumed reactive,  afebrile   Follow up with Dr. Byron Noonan in 1-2 weeks. Please have pt call [phone] to schedule an appointment.   CALL YOUR SURGEON at above number IF: You have any bleeding that does not stop, any pus draining from your wound, any fever (over 100.4 F) or chills, persistent nausea/vomiting, persistent diarrhea, or if your pain is not controlled on your discharge pain medications. No heavy lifting until cleared by surgeon.  Wound: You may take off outer pink dressing and shower after 48 hours. After showering, pat steri strips dry. Leave the white steri strips in place, they will fall off on their own in approximately 5-7 days or they will be removed at your follow up appointment.       2. HTN: stable  - Well controlled    3. UTI:  - s/p IV ceftriaxone x3 days    Patient cleared for discharge home with home PT with follow up with surgeon in 1 week    Diagnosis:   acute pancreatitis secondary to gall stones  hypertension  diabetes mellitus  acute abdominal pain  postoperative leukocytosis  acute blood loss anemia secondary to surgery and hemodilution  thrombocytosis  hyponatremia  hyperglycemia  hypokalemia  transaminitis  hypoalbuminemia  hypertriglyceridemia  hyperlactatemia  acute urinary tract infection

## 2024-01-04 NOTE — DISCHARGE NOTE PROVIDER - DETAILS OF MALNUTRITION DIAGNOSIS/DIAGNOSES
This patient has been assessed with a concern for Malnutrition and was treated during this hospitalization for the following Nutrition diagnosis/diagnoses:     -  01/02/2024: Moderate protein-calorie malnutrition

## 2024-01-09 DIAGNOSIS — E78.1 PURE HYPERGLYCERIDEMIA: ICD-10-CM

## 2024-01-09 DIAGNOSIS — K85.10 BILIARY ACUTE PANCREATITIS WITHOUT NECROSIS OR INFECTION: ICD-10-CM

## 2024-01-09 DIAGNOSIS — I10 ESSENTIAL (PRIMARY) HYPERTENSION: ICD-10-CM

## 2024-01-09 DIAGNOSIS — D62 ACUTE POSTHEMORRHAGIC ANEMIA: ICD-10-CM

## 2024-01-09 DIAGNOSIS — N39.0 URINARY TRACT INFECTION, SITE NOT SPECIFIED: ICD-10-CM

## 2024-01-09 DIAGNOSIS — E87.1 HYPO-OSMOLALITY AND HYPONATREMIA: ICD-10-CM

## 2024-01-09 DIAGNOSIS — E44.0 MODERATE PROTEIN-CALORIE MALNUTRITION: ICD-10-CM

## 2024-01-09 DIAGNOSIS — E88.09 OTHER DISORDERS OF PLASMA-PROTEIN METABOLISM, NOT ELSEWHERE CLASSIFIED: ICD-10-CM

## 2024-01-09 DIAGNOSIS — E87.6 HYPOKALEMIA: ICD-10-CM

## 2024-01-09 DIAGNOSIS — E11.65 TYPE 2 DIABETES MELLITUS WITH HYPERGLYCEMIA: ICD-10-CM

## 2025-08-20 ENCOUNTER — DOCTOR'S OFFICE (OUTPATIENT)
Facility: LOCATION | Age: 80
Setting detail: OPHTHALMOLOGY
End: 2025-08-20
Payer: MEDICAID

## 2025-08-20 DIAGNOSIS — H52.7: ICD-10-CM

## 2025-08-20 DIAGNOSIS — H25.13: ICD-10-CM

## 2025-08-20 DIAGNOSIS — H52.03: ICD-10-CM

## 2025-08-20 DIAGNOSIS — H16.223: ICD-10-CM

## 2025-08-20 DIAGNOSIS — H11.151: ICD-10-CM

## 2025-08-20 DIAGNOSIS — E11.9: ICD-10-CM

## 2025-08-20 PROCEDURE — 92004 COMPRE OPH EXAM NEW PT 1/>: CPT | Performed by: STUDENT IN AN ORGANIZED HEALTH CARE EDUCATION/TRAINING PROGRAM

## 2025-08-20 ASSESSMENT — VISUAL ACUITY
OD_BCVA: 20/30
OS_BCVA: 20/100

## 2025-08-20 ASSESSMENT — REFRACTION_MANIFEST
OD_CYLINDER: -2.25
OS_VA1: 20/30
OD_VA1: 20/50
OS_SPHERE: +3.00
OD_SPHERE: -2.00
OD_SPHERE: +3.00
OS_SPHERE: 0.00
OD_AXIS: 071
OS_CYLINDER: SPHERE
OS_CYLINDER: -3.25
OD_CYLINDER: SPHERE
OS_AXIS: 100

## 2025-08-20 ASSESSMENT — REFRACTION_AUTOREFRACTION
OS_CYLINDER: -3.75
OD_AXIS: 071
OD_SPHERE: -1.25
OD_CYLINDER: -2.00
OS_AXIS: 100
OS_SPHERE: 0.00

## 2025-08-20 ASSESSMENT — CONFRONTATIONAL VISUAL FIELD TEST (CVF)
OD_FINDINGS: FULL
OS_FINDINGS: FULL

## 2025-08-20 ASSESSMENT — TEAR BREAK UP TIME (TBUT)
OS_TBUT: T
OD_TBUT: T

## 2025-08-20 ASSESSMENT — SUPERFICIAL PUNCTATE KERATITIS (SPK)
OD_SPK: T
OS_SPK: T

## 2025-08-20 ASSESSMENT — REFRACTION_CURRENTRX
OD_OVR_VA: 20/
OS_OVR_VA: 20/

## 2025-08-20 ASSESSMENT — KERATOMETRY
OS_AXISANGLE_DEGREES: 011
OD_AXISANGLE_DEGREES: 168
OS_K1POWER_DIOPTERS: 42.50
OS_K2POWER_DIOPTERS: 43.75
OD_K2POWER_DIOPTERS: 44.00
OD_K1POWER_DIOPTERS: 42.75

## (undated) DEVICE — TUBING INSUFFLATION LAP FILTER 10FT

## (undated) DEVICE — SOL IRR POUR NS 0.9% 1000ML

## (undated) DEVICE — SUT MONOCRYL 4-0 27" PS-2 UNDYED

## (undated) DEVICE — DRAPE TOWEL BLUE 17" X 24"

## (undated) DEVICE — ELCTR REM POLYHESIVE PATIENT RETURN ELECTRODE ADULT

## (undated) DEVICE — DRSG STERISTRIPS 0.5 X 4"

## (undated) DEVICE — BASIN SET SINGLE

## (undated) DEVICE — FRA-ESU BOVIE FORCE TRIAD T7J19731DX: Type: DURABLE MEDICAL EQUIPMENT

## (undated) DEVICE — Device

## (undated) DEVICE — POSITIONER PATIENT SAFETY STRAP 3X60"

## (undated) DEVICE — BLADE SURGICAL #15 CARBON

## (undated) DEVICE — GLV 7 PROTEXIS (WHITE)

## (undated) DEVICE — ELCTR STRYKER NEPTUNE SMOKE EVACUATION PENCIL (GREEN)

## (undated) DEVICE — TUBING STRYKER PNEUMOCLEAR SMOKE HEAT HUMID

## (undated) DEVICE — ENDOCATCH 10MM SPECIMEN POUCH

## (undated) DEVICE — DRAPE 3/4 SHEET 52X76"

## (undated) DEVICE — POSITIONER FOAM LAMINECTOMY ARM CRADLE (PINK)

## (undated) DEVICE — CANISTER DISPOSABLE THIN WALL 3000CC

## (undated) DEVICE — POSITIONER STRAP ARMBOARD VELCRO TS-30

## (undated) DEVICE — POSITIONER FOAM HEAD DONUT 9" (PINK)

## (undated) DEVICE — TIP METZENBAUM SCISSOR MONOPOLAR ENDOCUT (ORANGE)

## (undated) DEVICE — TUBING HYDRO-SURG PLUS IRRIGATOR W SMOKEVAC & PROBE

## (undated) DEVICE — SUT VICRYL 3-0 27" SH UNDYED

## (undated) DEVICE — ELCTR GROUNDING PAD ADULT COVIDIEN

## (undated) DEVICE — SYR LUER LOK 20CC

## (undated) DEVICE — WARMING BLANKET UPPER ADULT

## (undated) DEVICE — SOL IRR POUR H2O 500ML

## (undated) DEVICE — PACK GENERAL LAPAROSCOPY

## (undated) DEVICE — SPONGE ENDO PEANUT 5MM

## (undated) DEVICE — TROCAR APPLIED MEDICAL KII BALLOON BLUNT TIP 12MM X 100MM

## (undated) DEVICE — PROTECTOR HEEL / ELBOW

## (undated) DEVICE — TUBING OLYMPUS INSUFFLATION

## (undated) DEVICE — WARMING BLANKET FULL UNDERBODY

## (undated) DEVICE — SUT VICRYL 0 27" UR-6

## (undated) DEVICE — GLV 7.5 PROTEXIS (WHITE)

## (undated) DEVICE — D HELP - CLEARVIEW CLEARIFY SYSTEM

## (undated) DEVICE — ELCTR BOVIE TIP BLADE INSULATED 2.75" EDGE

## (undated) DEVICE — TROCAR COVIDIEN VERSAPORT BLADELESS OPTICAL 5MM STANDARD

## (undated) DEVICE — VENODYNE/SCD SLEEVE CALF MEDIUM